# Patient Record
Sex: FEMALE | Race: NATIVE HAWAIIAN OR OTHER PACIFIC ISLANDER | NOT HISPANIC OR LATINO | URBAN - METROPOLITAN AREA
[De-identification: names, ages, dates, MRNs, and addresses within clinical notes are randomized per-mention and may not be internally consistent; named-entity substitution may affect disease eponyms.]

---

## 2019-03-18 ENCOUNTER — TELEPHONE (OUTPATIENT)
Dept: PSYCHIATRY | Facility: CLINIC | Age: 34
End: 2019-03-18
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
## 2019-03-27 ENCOUNTER — OFFICE VISIT (OUTPATIENT)
Dept: BEHAVIORAL/MENTAL HEALTH CLINIC | Facility: CLINIC | Age: 34
End: 2019-03-27
Payer: COMMERCIAL

## 2019-03-27 DIAGNOSIS — F41.1 GENERALIZED ANXIETY DISORDER: Primary | ICD-10-CM

## 2019-03-27 PROCEDURE — 90791 PSYCH DIAGNOSTIC EVALUATION: CPT | Performed by: SOCIAL WORKER
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
## 2019-03-29 ENCOUNTER — DOCUMENTATION (OUTPATIENT)
Dept: BEHAVIORAL/MENTAL HEALTH CLINIC | Facility: CLINIC | Age: 34
End: 2019-03-29

## 2019-03-29 NOTE — PROGRESS NOTES
Treatment Plan Tracking    # 1Treatment Plan not signed due to: Clau Cunha was unable to sign her tx plan due to having to leave early to get her daughter from the bus  She agreed to sign at next session

## 2019-04-12 ENCOUNTER — OFFICE VISIT (OUTPATIENT)
Dept: BEHAVIORAL/MENTAL HEALTH CLINIC | Facility: CLINIC | Age: 34
End: 2019-04-12
Payer: COMMERCIAL

## 2019-04-12 DIAGNOSIS — F41.1 GENERALIZED ANXIETY DISORDER: Primary | ICD-10-CM

## 2019-04-12 PROCEDURE — 90834 PSYTX W PT 45 MINUTES: CPT | Performed by: SOCIAL WORKER

## 2019-04-18 ENCOUNTER — OFFICE VISIT (OUTPATIENT)
Dept: BEHAVIORAL/MENTAL HEALTH CLINIC | Facility: CLINIC | Age: 34
End: 2019-04-18
Payer: COMMERCIAL

## 2019-04-18 DIAGNOSIS — F41.1 GENERALIZED ANXIETY DISORDER: Primary | ICD-10-CM

## 2019-04-18 PROCEDURE — 90834 PSYTX W PT 45 MINUTES: CPT | Performed by: SOCIAL WORKER

## 2019-04-22 ENCOUNTER — OFFICE VISIT (OUTPATIENT)
Dept: BEHAVIORAL/MENTAL HEALTH CLINIC | Facility: CLINIC | Age: 34
End: 2019-04-22
Payer: COMMERCIAL

## 2019-04-22 DIAGNOSIS — F41.1 GENERALIZED ANXIETY DISORDER: Primary | ICD-10-CM

## 2019-04-22 PROCEDURE — 90834 PSYTX W PT 45 MINUTES: CPT | Performed by: SOCIAL WORKER

## 2019-04-29 ENCOUNTER — OFFICE VISIT (OUTPATIENT)
Dept: BEHAVIORAL/MENTAL HEALTH CLINIC | Facility: CLINIC | Age: 34
End: 2019-04-29
Payer: COMMERCIAL

## 2019-04-29 DIAGNOSIS — F41.1 GENERALIZED ANXIETY DISORDER: Primary | ICD-10-CM

## 2019-04-29 PROCEDURE — 90834 PSYTX W PT 45 MINUTES: CPT | Performed by: SOCIAL WORKER

## 2019-05-06 ENCOUNTER — OFFICE VISIT (OUTPATIENT)
Dept: BEHAVIORAL/MENTAL HEALTH CLINIC | Facility: CLINIC | Age: 34
End: 2019-05-06
Payer: COMMERCIAL

## 2019-05-06 DIAGNOSIS — F41.1 GENERALIZED ANXIETY DISORDER: Primary | ICD-10-CM

## 2019-05-06 PROCEDURE — 90834 PSYTX W PT 45 MINUTES: CPT | Performed by: SOCIAL WORKER

## 2019-05-21 ENCOUNTER — OFFICE VISIT (OUTPATIENT)
Dept: BEHAVIORAL/MENTAL HEALTH CLINIC | Facility: CLINIC | Age: 34
End: 2019-05-21
Payer: COMMERCIAL

## 2019-05-21 DIAGNOSIS — F41.1 GENERALIZED ANXIETY DISORDER: Primary | ICD-10-CM

## 2019-05-21 PROCEDURE — 90834 PSYTX W PT 45 MINUTES: CPT | Performed by: SOCIAL WORKER

## 2019-07-02 ENCOUNTER — SOCIAL WORK (OUTPATIENT)
Dept: BEHAVIORAL/MENTAL HEALTH CLINIC | Facility: CLINIC | Age: 34
End: 2019-07-02
Payer: COMMERCIAL

## 2019-07-02 DIAGNOSIS — F41.1 GENERALIZED ANXIETY DISORDER: Primary | ICD-10-CM

## 2019-07-02 PROCEDURE — 90834 PSYTX W PT 45 MINUTES: CPT | Performed by: SOCIAL WORKER
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
## 2019-07-08 ENCOUNTER — SOCIAL WORK (OUTPATIENT)
Dept: BEHAVIORAL/MENTAL HEALTH CLINIC | Facility: CLINIC | Age: 34
End: 2019-07-08
Payer: COMMERCIAL

## 2019-07-08 DIAGNOSIS — F41.1 GENERALIZED ANXIETY DISORDER: Primary | ICD-10-CM

## 2019-07-08 PROCEDURE — 90834 PSYTX W PT 45 MINUTES: CPT | Performed by: SOCIAL WORKER

## 2019-07-09 NOTE — PSYCH
Psychotherapy Provided: Individual Psychotherapy 50 minutes     Length of time in session: 50 minutes, follow up in 2-1/2 weeks    Goals addressed in session: Goal 1     Pain:      none    0    Current suicide risk : Low     DATA: Met with Allegra Zhang for scheduled individual session  Allegra Zhang talked about her recent trip to [place]  She states that her family had a very nice time  We spoke, at length, about her relationship with her ex- and the status of their divorce  She states she continues to wait for him to file for divorce, as she does not have the financial ability to do so  She talked about her previous attempts to maintain his connection with their children; e g , she would often reach out to her to ask him if he was going to attend events or was planning to have a visit with them  She states she is working on allowing him to initiate the contact with the children  She states that it creates more stress for her to be caring for them and also thinking about scheduling for him  She states she will do whatever she can to help maintain the connection; however, she is no longer going to make his plans for him  Allegra Zhang continues to state that her new relationship is going well  Her boyfriend is being emotionally supportive to her  She also reports that her children are getting along well with him  ASSESSMENT: Allegra Zhang presents with a primarily euthymic mood  Her affect is full range and congruent with her mood and the content of the session  Allegra Zhang exhibits continued positive rapport with this clinician  Allegra Zhang appears to have improving insight regarding her tendency to be codependent (e g , reminding her ex- of visits with the children, etc )  Allegra Zhang exhibits good judgement with regard to her decision to do what she can to maintain the children's positive relationship with their father  Allegra Zhang continues to exhibit willingness to work on treatment goals and objectives         PLAN: Allegra Zhang will return in 2 5 weeks for the next session  Between sessions, Ramnoa Mcintosh will continue to work on practicing mindfulness to the moment and basic stress management techniques  She will report back during the next session re: successes and barriers  At the next session, this clinician will use mindfulness-based strategies and client-centered therapy to address her anxiety, in an effort to assist Ramona Mcintosh with meeting treatment goals  Behavioral Health Treatment Plan [facility]: Diagnosis and Treatment Plan explained to João Stewart relates understanding diagnosis and is agreeable to Treatment Plan   Yes

## 2019-07-26 ENCOUNTER — SOCIAL WORK (OUTPATIENT)
Dept: BEHAVIORAL/MENTAL HEALTH CLINIC | Facility: CLINIC | Age: 34
End: 2019-07-26
Payer: COMMERCIAL

## 2019-07-26 DIAGNOSIS — F41.1 GENERALIZED ANXIETY DISORDER: Primary | ICD-10-CM

## 2019-07-26 PROCEDURE — 90834 PSYTX W PT 45 MINUTES: CPT | Performed by: SOCIAL WORKER

## 2019-07-29 NOTE — PSYCH
Psychotherapy Provided: Individual Psychotherapy 45 minutes     Length of time in session: 45 minutes, follow up in 2 months Juliette Naik will remain on the cancellation wait list for a sooner appointment)  Goals addressed in session: Goal 1     Pain:      none    0    Current suicide risk : Low     DATA: Met with Chichi for scheduled individual session  Chichi discussed her frustration with her  and her 's mother  She states that they are arguing with her regarding her relationship with her new boyfriend  She states that her 's mother was watching her son, stopped paying attention to him, and then blamed her boyfriend for not making sure that her son was safe  Chichi states that her son was running in grass and was in no danger at all  She received a text message from her  relaying information that her boyfriend will no longer be able to watch the children if he is not cognizant of their whereabouts  Chichi states that nothing bad happened and that she set some appropriate limits with her  regarding his texts t her and his mother's visits with the children  Chichi talked about her relationship with her SO  She states that she is feeling very happy with him  She states that, despite the brevity of this relationship, he is making plans for their future  She states that he has purchased tickets to go to [place] in December  She states that he has also been helping her financially  She states that she is trying to take things slowly with him (she refers to it as "our slow")  She states he has suggested marriage and engagement; however, she wants to wait until she and her  are   Chichi discussed his relationship with his parents  She states that his parents have met her a few times but have told her SO that they do not want to develop a relationship with her yet, because they feel that the relationship is too new   She states that earlier today, she was at [facility] with her client  She saw his parents three times during her shopping, and they completely ignored her  She states she made eye contact with them and smiled  She states they looked the other way and did not greet her at all  She states that her SO was going to call them to discuss this; however, she does not want him to terminate his relationship with them over this  She does, however, state that family is very important to her, and she does not want to join a family that does not accept her  She discussed the situation further and states that her SO's brothers have no contact with their parents, due to "something between them and his brother's wives " We discussed the possibility of this being a pattern for them  Jose Chaudhry is agreeable to having an open discussion with her SO about this topic  Jose Chaudhry wants to be seen prior to her next scheduled session (which is in September)  She will be added to the cancellation wait list for a sooner appointment  ASSESSMENT: Jose Chaudhry presents with a primarily euthymic mood, with some appropriate anxiety/sadness related to her SO's parents not acknowledging her today  Her affect is full range and congruent with her mood and the content of the session  Jose Chaudhry exhibits a positive therapeutic rapport with this clinician  Jose Chaudhry appears to have improving insight regarding her need to set boundaries and limits with her   Jose Chaudhry exhibits good judgement with regard to her decision to discuss her feelings with her SO, regarding his parents  Jose Chaudhry continues to exhibit willingness to work on treatment goals and objectives  PLAN: Jose Chaudhry will return in 2 months for the next scheduled session; however, she will be on the cancellation wait list for a sooner appointment  Between sessions, Jose Chaudhry will continue to set appropriate limits and boundaries in her various relationships and will report back during the next session re: successes and barriers   At the next session, this clinician will use DBT-informed skills, mindfulness strategies, and client-centered therapy to address Monse's anxiety and relationship concerns, in an effort to assist John Gary with meeting treatment goals  Behavioral Health Treatment Plan [facility]: Diagnosis and Treatment Plan explained to Tanya Griggs relates understanding diagnosis and is agreeable to Treatment Plan   Yes

## 2019-08-08 ENCOUNTER — SOCIAL WORK (OUTPATIENT)
Dept: BEHAVIORAL/MENTAL HEALTH CLINIC | Facility: CLINIC | Age: 34
End: 2019-08-08
Payer: COMMERCIAL

## 2019-08-08 DIAGNOSIS — F41.1 GENERALIZED ANXIETY DISORDER: Primary | ICD-10-CM

## 2019-08-08 PROCEDURE — 90834 PSYTX W PT 45 MINUTES: CPT | Performed by: SOCIAL WORKER

## 2019-08-08 NOTE — BH TREATMENT PLAN
Andrew Gonzalez  1985       Date of Initial Treatment Plan: March 27, 2019   Date of Current Treatment Plan: 08/08/19    Treatment Plan Number 2     Strengths/Personal Resources for Self Care: Supportive relationships (mother, SO); Desire to be a good mother; Motivation to attend regular therapy; Good clinical rapport     Diagnosis:   1  Generalized anxiety disorder         Area of Needs: Anxiety      Long Term Goal 1: Chichi would like to be able to manage anxiety in various facets of her life (e g , anxiety related to her pending divorce, her relationship with her ex-, visitation between her ex- and their children, moving forward in her new life, and social anxiety situations)  Target Date: November 15, 2019  Completion Date: to be determined         Short Term Objectives for Goal 1:  1  Chichi will continue to identify triggers and prompting events that increase her symptoms of anxiety  She will review newly discovered triggers and prompting events in therapy sessions  2  Chichi will identify, review, and maintain her personal limits in various relationships (e g , her relationship with her ex-, her ex-'s mother, and her relationship with her significant other)  3  Chichi will learn how to reframe her thoughts, in order to increase her self-respect and self-confidence  She will discuss successes and barriers in therapy sessions  4  Chichi will learn and use mindfulness-based skills to help her cope with and manage her anxiety  GOAL 1: Modality: Individual 1-2x per month   Completion Date to be determined and The person(s) responsible for carrying out the plan is  Chichi (client) and Leonid Jones (clinician)    Clinician will use mindfulness-based strategies, DBT-informed skills, CBT techniques, client-centered therapy, solution-focused therapy, Motivational Interviewing to address Eves symptoms of anxiety   Chichi will practice skills between sessions and will report back, during subsequent sessions regarding successes and barriers  Behavioral Health Treatment Plan [facility]: Diagnosis and Treatment Plan explained to Supriya Atkins relates understanding diagnosis and is agreeable to Treatment Plan  Client Comments : Please share your thoughts, feelings, need and/or experiences regarding your treatment plan: "I look forward to my appointments  I get sad when they are so far apart  I rely on this one hour  I feel like I've made progress in some areas   I don't feel like there needs to be anything done differently "

## 2019-08-12 NOTE — PSYCH
Psychotherapy Provided: Individual Psychotherapy 45 minutes     Length of time in session: 45 minutes, follow up in 6 weeks for next scheduled appointment  Cy Beasley will remain on cancellation wait list for a sooner appointment)    Goals addressed in session: Goal 1     Pain:      none    0    Current suicide risk : Low     DATA: Met with Baldemar Cruz for scheduled individual session  During this session, Baldemar Cruz and LUCITA discussed and updated her treatment plan  She identifies that she is developing a positive therapeutic relationship and feels that the therapeutic interactions have been helpful with regard to processing her anxiety and learning new skills to help her manage her moods  Baldemar Cruz states that she would like to have the ability to set and maintain her personal limits--especially with regard to her ex- and his mother  Baldemar Cruz discussed some recent interactions she has had with her  and his mother  She states that her current SO is encouraging her to work on maintaining better limits, as he does not like to see Baldemar Cruz get upset  Baldemar Cruz identifies that she does not like conflict; therefore, she acknowledges that she might take on too much responsibility for making everyone happy  She states she is unsure why her ex- has not yet officially filed for divorce; but she does believe it is because he is living in [place]  She has a perception that [place] has stricter rules regarding divorce and child custody  She states she has a fear that he could potentially petition for custody of the children  When we reality tested this fear, she was able to acknowledge that she still has a video recording of him engaging in inappropriate behaviors while the children were in the same room  She is able to identify that a  who views this video recording would not allow her ex- to have unsupervised visits until he has engaged in some sort of therapy  Baldemar Cruz continues to move forward with her current relationship   We discussed her feelings about her boyfriend's parents  She states that they did not intentionally ignore her (as she had thought during our previous session); however, she continues to report that her feelings are hurt by them not wanting to get to know her until the relationship has continued for a while  She continues to work on setting limits and boundaries with her boyfriend regarding the future of their relationship  ASSESSMENT: Paul Wilkerson presents with a primarily euthymic mood, with some anxiety  Her affect is full range and congruent with her mood and the content of the session  Paul Wilkerson exhibits a positive therapeutic rapport with this clinician  Paul Wilkerson appears to have some insight regarding her difficulty with setting and maintaining appropriate limits in various relationships (e g , with her ex-, his mother, and her current SO)  Paul Wilkerson continues to exhibit willingness to work on treatment goals and objectives  PLAN: Paul Wilkerson will return in 6 weeks for the next scheduled session; however, she will remain on the cancellation wait list for a sooner appointment  Between sessions, Paul Wilkerson will identify some limits/boundaries that she would like to maintain  We will discuss these in our next session and will work out a plan to help her maintain her personal limits and practice getting more comfortable with her decisions  She will report back during the next session re: successes and barriers  At the next session, this clinician will use client-centered therapy and mindfulness-based strategies to address her mood regulation and anxiety, in an effort to assist Paul Wilkerson with meeting treatment goals  Behavioral Health Treatment Plan [facility]: Diagnosis and Treatment Plan explained to Fawn Rodriguez relates understanding diagnosis and is agreeable to Treatment Plan   Yes

## 2019-08-21 ENCOUNTER — SOCIAL WORK (OUTPATIENT)
Dept: BEHAVIORAL/MENTAL HEALTH CLINIC | Facility: CLINIC | Age: 34
End: 2019-08-21
Payer: COMMERCIAL

## 2019-08-21 DIAGNOSIS — F41.1 GENERALIZED ANXIETY DISORDER: Primary | ICD-10-CM

## 2019-08-21 PROCEDURE — 90834 PSYTX W PT 45 MINUTES: CPT | Performed by: SOCIAL WORKER

## 2019-08-21 NOTE — PSYCH
Psychotherapy Provided: Individual Psychotherapy 45 minutes     Length of time in session: 45 minutes, follow up in 1 month Gabino Reardon will remain on the cancellation wait list for a sooner appointment)    Goals addressed in session: Goal 1     Pain:      none    0    Current suicide risk : Low     DATA: Met with Jose Azar for scheduled individual session  Jose Azar reports that she has been doing ok  She states that she continues to struggle with the balance between her ex- and her current SO  She states she is working on developing her own personal limits regarding supervising visits between her ex- and their children  She states she was feeling very good about saying "no" to a last-minute visit on her birthday weekend  She states she will never deny him access to visitation; however, she is trying to work on maintaining a limit of providing the visits only with appropriate advance notice  She states that her SO would prefer she not supervise the visits at all; however, she continues to feel that her supervision is important to help the children maintain a paternal contact  She states that her ex- offered to pay for their daughter's swimming tuition  Jose Azar was happy about this; however, she also was able to identify that she struggles to trust that he is doing it without any strings attached  Jose Azar discussed her ongoing relationship with her new boyfriend  She states he continues to pressure her to make plans for their future lives together  She states that she enjoys being with him; however, she does have some anxiety related to how much he wants her to commit to marrying him and having a child together in the future  She does report that his parents have expressed an interest in meeting her and in getting to know her children  She states this is a positive; however, she is cautious regarding introducing people to her children, as she fears that her children will be hurt         ASSESSMENT: Jose Azar presents with a primarily euthymic mood  Her affect is full range and congruent with her mood and the content of the session  Abilio Kruger exhibits a positive therapeutic rapport with this clinician  Abilio Kruger appears to have some improving insight regarding her ability to set and maintain some healthy personal limits  Abilio Kruger continues to exhibit willingness to work on treatment goals and objectives  PLAN: Abilio Kruger will return in one month for the next scheduled session; however, she will remain on the cancellation wait list for a sooner appointment  Between sessions, Abilio Kruger will continue to identify personal limits and will report back during the next session re: successes and barriers  At the next session, this clinician will use client-centered therapy, DBT-informed skills, and CBT techniques to address her anxiety, in an effort to assist Abilio Kruger with meeting treatment goals  Behavioral Health Treatment Plan [facility]: Diagnosis and Treatment Plan explained to Kandice Landers relates understanding diagnosis and is agreeable to Treatment Plan   Yes

## 2019-09-25 ENCOUNTER — SOCIAL WORK (OUTPATIENT)
Dept: BEHAVIORAL/MENTAL HEALTH CLINIC | Facility: CLINIC | Age: 34
End: 2019-09-25
Payer: COMMERCIAL

## 2019-09-25 DIAGNOSIS — F41.1 GENERALIZED ANXIETY DISORDER: Primary | ICD-10-CM

## 2019-09-25 PROCEDURE — 90834 PSYTX W PT 45 MINUTES: CPT | Performed by: SOCIAL WORKER

## 2019-09-25 NOTE — PSYCH
Psychotherapy Provided: Individual Psychotherapy 45 minutes     Length of time in session: 45 minutes, follow up in  1 week    Goals addressed in session: Goal 1     Pain:    No physical pain reported, mild to moderate anxiety     Current suicide risk : Low      DATA: Met with Cris Ritchie for scheduled individual session  Cris Ritchie discussed current stress in relation to her boyfriend's mother  His mother flips between being friendly and accepting of Cris Putt to saying very hurtful things  Cris Ritchie read this clinician a text in which she told Cris Ritchie she wanted nothing to do with her or her children and she was not welcome in the family  Cris Ritchie reports she cried when she found the text and since then has had a lot of anxiety, especially if she might run into her boyfriend's mother  Despite the hurtful things that were said, Cris Ritchie stated has a need for acceptance and family and believes she may accept an apology from her if one is offered  Cris Ritchie discussed how her boyfriend's brothers have nothing to do with their mother due to her behavior  Monse's boyfriend has been defensive of Cris Putt to his mother and his mother's behaviors have made him angry  Cris Ritchie has been processing her feelings regarding a future and a family with her boyfriend  Cris Ritchie states she does not know how to manage the relationship with his mother  We discussed potentially working on developing a way for her to maintain a relationship with some solid personal boundaries and limits in place to help her protect herself emotionally  Cris Ritchie continues to struggle with her relationship with her ex- and his contact with the children  She states that he communicated with her, via text, stating that she is keeping him from being able to see the children  She states that since he has moved to [place], he has made no effort to change the legal status of their marriage or of the custody orders       ASSESSMENT: Cris Ritchie presents with a primarily euthymic mood with some anxiety when discussing stressors  Full range affect is congruent to topic area  Cris Ritchie exhibits continued positive rapport with this clinician  Cris Ritchie appears to have normal insight and judgment  Cris Ritchie continues to exhibit willingness to work on treatment goals and objectives  PLAN: Cris Ritchie will return in 1 week for the next scheduled session  Between sessions, Cris Ritchie will continue to work on her emotional boundaries in relationships and will report back during the next session re: successes and barriers  At the next session, this clinician will use client-centered therapy, mindfulness-based strategies, DBT-informed skills and CBT techniques to address Monse's anxiety, in an effort to assist Cris Ritchie with meeting treatment goals  Behavioral Health Treatment Plan [facility]: Diagnosis and Treatment Plan explained to Wendycrispin Samsons relates understanding diagnosis and is agreeable to Treatment Plan   Yes

## 2019-10-01 ENCOUNTER — TELEPHONE (OUTPATIENT)
Dept: BEHAVIORAL/MENTAL HEALTH CLINIC | Facility: CLINIC | Age: 34
End: 2019-10-01

## 2019-10-01 NOTE — TELEPHONE ENCOUNTER
T/C to confirm appointment scheduled for October 2, 2019 @ 1:00pm  Carlos Manuel Sebastian did not auto-confirm this appointment; therefore, I made a direct confirmation call  Spoke with client  Confirmed they will be in attendance at the scheduled time

## 2019-10-02 ENCOUNTER — SOCIAL WORK (OUTPATIENT)
Dept: BEHAVIORAL/MENTAL HEALTH CLINIC | Facility: CLINIC | Age: 34
End: 2019-10-02
Payer: COMMERCIAL

## 2019-10-02 DIAGNOSIS — F41.1 GENERALIZED ANXIETY DISORDER: Primary | ICD-10-CM

## 2019-10-02 PROCEDURE — 90834 PSYTX W PT 45 MINUTES: CPT | Performed by: SOCIAL WORKER

## 2019-10-02 NOTE — PSYCH
Psychotherapy Provided: Individual Psychotherapy 45 minutes     Length of time in session: 45 minutes, follow up in 2 weeks    Goals addressed in session: Goal 1     Pain:      mild to moderate anxiety related to current life stressors    Current suicide risk : Low     DATA: Met with Nata Medrano for scheduled individual session  Nata Medrano got engaged to her boyfriend last week but feels that she cannot be happy because everything around her is "crazy " She reported her mother is supportive but her fiance's mother texted her that she is not accepting the relationship and that her boyfriend committed fraud in order to purchase the ring by taking out a credit card in his father's name  Nata Medrano feels bad that this led her to doubt her boyfriend but she asked him about the situation and he denies any validity to it  Nata Medrano is hurt and fearful of her harriet's mother and has stated she does want to have any current or future relationship with her to her boyfriend  He has been supportive and she believes he is coming to terms with the issues with his mother  Nata Medrano reported that she has not been sleeping as she has been ruminating over the situation for the past three days  She has a prescription for Ambien and took one the night before because she knew she needed to sleep  She has not been able to do any of the other things that relieve her anxiety such as going to the gym but plans on taking part in a 10 week fitness program with her fiance where she will be working out five days a week  This clinician discussed mindfulness techniques which Nata Medrano can use to stop the intrusive thoughts and feelings of anxiety she is experiencing  Nata Medrano also agreed to block texts and social media contact with her fiivania's mother to avoid future interactions that are hurtful and increase her anxiety  ASSESSMENT: Nata Medrano presents with a mostly euthymic mood  Full range of affect is congruent to topic area   Nata Medrano exhibits excellent rapprt with this clinician  Stacey Sheridan appears to have normal insight and judgment  Stacey Sheridan continues to exhibit willingness to work on treatment goals and objectives  PLAN: Stacey Sheridan will return in 2 weels for the next scheduled session  Between sessions, Stacey Sheridan will will practice mindfulness techniques and self care as well as continue to set boundaries that protect her emotionally interpersonal relationships and will report back during the next session re: successes and barriers  At the next session, this clinician will use client-centered therapy, mindfulness-based strategies and DBT-informed skills to address Monse's anxiety related to relationships and current life stressors, in an effort to assist Stacey Sheridan with meeting treatment goals  Behavioral Health Treatment Plan [facility]: Diagnosis and Treatment Plan explained to Tung Bolton relates understanding diagnosis and is agreeable to Treatment Plan   Yes

## 2019-10-15 ENCOUNTER — TELEPHONE (OUTPATIENT)
Dept: BEHAVIORAL/MENTAL HEALTH CLINIC | Facility: CLINIC | Age: 34
End: 2019-10-15

## 2019-10-15 NOTE — TELEPHONE ENCOUNTER
T/C to confirm appointment scheduled for October 16, 2019 @ 2:00pm  Gregorio Farmer did not auto-confirm this appointment; therefore, I made a direct confirmation call  Spoke with Gregorio Marleny  Confirmed they will be in attendance at the scheduled time

## 2019-10-16 ENCOUNTER — SOCIAL WORK (OUTPATIENT)
Dept: BEHAVIORAL/MENTAL HEALTH CLINIC | Facility: CLINIC | Age: 34
End: 2019-10-16
Payer: COMMERCIAL

## 2019-10-16 DIAGNOSIS — F41.1 GENERALIZED ANXIETY DISORDER: Primary | ICD-10-CM

## 2019-10-16 PROCEDURE — 90834 PSYTX W PT 45 MINUTES: CPT | Performed by: SOCIAL WORKER

## 2019-10-16 NOTE — PSYCH
Psychotherapy Provided: Individual Psychotherapy 45 minutes     Length of time in session: 45 minutes, follow up in 1 week    Goals addressed in session: Goal 1     Pain:      No current pain reported, mild anxiety reported at this time    Current suicide risk : Low     DATA: Met with Nina Inakoko for scheduled individual session  Nina Jenkins was able to put in writing some of the concerns she has from her children due to her ex 's behavior  She stated she felt good writing it and reading it during this session and is hoping to build the courage to send it if there is any future conflict regarding him making her feel guilty he is not seeing the children more  Monse's fiivania has decided that he is not going to communicate with his parents who have moved out of state  Nina Iankoko has spoken with one of her fiance's sisters-in-law regarding her experiences with his mother  She states that the sister-in-law validated that the behaviors were the same from his mother to her, and that is why the other brothers do not speak to their parents  Nina Inakoko states that the discussion with her helped her to feel that she is "not crazy " She reports her relationship with Roseanne Simms has been going well  They joined a fitness class but on the first day Nina Jenkins had so much anxiety she was not able to go in  She did attend the next day and is now really enjoying it  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  Ninapaula Jenkins aspires to be an   She is more comfortable attending school because her fiance will be with her as she has some anxiety about being in a classroom with a lot of people  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/grants she will be getting for her education  She states this career move has been a dream of hers for a long time, and she feels that the support of her fiance allows her to move forward with starting a new career path  ASSESSMENT: Payton Duggan presents with a mostly euthymic, somewhat anxious mood  Full range affect is congruent to topic area  Payton Duggan exhibits continued positive rapport with this clinician  Payton Duggan appears to have normal insight and judgment  Payton Duggan continues to exhibit willingness to work on treatment goals and objectives  PLAN: Payton Duggan will return in 1 week for the next scheduled session  Between sessions, Payton Duggan will continue to monitor triggers that impact her mood and utilize coping skills to help her in managing her anxiety  and will report back during the next session re: successes and barriers  At the next session, this clinician will use client-centered therapy, mindfulness-based strategies and DBT-informed skills to address Monse's anxiety in an effort to assist Payton Duggan with meeting treatment goals  Behavioral Health Treatment Plan [facility]: Diagnosis and Treatment Plan explained to Joce Pandey relates understanding diagnosis and is agreeable to Treatment Plan  Yes

## 2019-10-23 ENCOUNTER — SOCIAL WORK (OUTPATIENT)
Dept: BEHAVIORAL/MENTAL HEALTH CLINIC | Facility: CLINIC | Age: 34
End: 2019-10-23
Payer: COMMERCIAL

## 2019-10-23 DIAGNOSIS — F41.1 GENERALIZED ANXIETY DISORDER: Primary | ICD-10-CM

## 2019-10-23 PROCEDURE — 90834 PSYTX W PT 45 MINUTES: CPT | Performed by: SOCIAL WORKER

## 2019-10-23 NOTE — PSYCH
Psychotherapy Provided: Individual Psychotherapy 45 minutes     Length of time in session: 45 minutes, follow up in  1 week    Goals addressed in session: Goal 1     Pain:      None    Current suicide risk : Low     DATA: Met with Miguel Angel Thapa for scheduled individual session  Miguel Angel Thapa described stressors related to her [de-identified] year old son's behaviors  She is considering getting him into play therapy  He gets angry and has some impulsive behaviors in   Miguel Angel Thapa could not attend her fitness class the other day because of his behavior which was very upsetting to her  This clinician and Miguel Angel Elier discussed all the changes in his life that could be impacting him  Miguel Angel Thapa also stated her schedule is hectic and she feels tired and overwhelmed at times  After school the children have swimming and she and Levi Lisethmajor take a workout class  She feels tired by the end of her days and the children are still awake and it is a lot to handle  There is both physical exhaustion and some financial stress  She has a plan to manage her expenses as she and Levi Angelo plan to attend school shortly  She feels confident that she will be able to balance her family's needs despite paying for school if she budgets appropriately  Miguel Angel Thapa reports improved relationships with her ex mother and law and that things are going well with Levi Shea's biggest stressor at this time is her son's behavior and some difficulties with her son's  which she discussed in session  Miguel Angel Thapa is thinking to explore play therapy with him  ASSESSMENT: Miguel Angel Thapa presents with a mostly euthymic, somewhat anxious mood  Normal affect is congruent to topic area  Miguel Angel Thapa exhibits good working rapport with this clinician  Miguel Angel Thapa appears to have normal insight and judgment  Miguel Angel Thapa continues to exhibit willingness to work on treatment goals and objectives  PLAN: Miguel Angel Thapa will return in 1 week for the next scheduled session   Between sessions, Miguel Angel Thapa will continue to monitor her anxiety and any triggers to her mood any and will report back during the next session re: successes and barriers  At the next session, this clinician will use client-centered therapy, mindfulness-based strategies and DBT-informed skills to address Monse's anxiety and relationship issues, in an effort to assist Gabino Cabral with meeting treatment goals  Behavioral Health Treatment Plan [facility]: Diagnosis and Treatment Plan explained to Collin Correa relates understanding diagnosis and is agreeable to Treatment Plan   Yes

## 2019-10-24 NOTE — PROGRESS NOTES
Assessment/Plan:  Possible franciscotegeniechpooja, Gordo Chan, ovulatory mucus- but not congruent with menstrual timing  Vs    Possible ovarian cyst causing pain and menstrual dysfunction  Reassured  Return to the office with the next episode  Probable ultrasound  Prenatal vitamins with 5714 micro g of folic acid daily  Diagnoses and all orders for this visit:    Pelvic pain    Vaginal discharge    Abnormal uterine bleeding    Other orders  -     albuterol (PROVENTIL HFA,VENTOLIN HFA) 90 mcg/act inhaler; Inhale 2 puffs every 6 (six) hours as needed  -     beclomethasone (QVAR REDIHALER) 40 MCG/ACT inhaler; Inhale 2 puffs 2 (two) times a day  -     fluticasone (FLONASE) 50 mcg/act nasal spray; 2 sprays into each nostril  -     loratadine (CLARITIN) 10 mg tablet; Take 10 mg by mouth  -     montelukast (SINGULAIR) 10 mg tablet; Take 10 mg by mouth  -     Discontinue: norethindrone-ethinyl estradiol (JUNEL FE 1/20) 1-20 MG-MCG per tablet; Take 1 tablet by mouth daily  -     Discontinue: oxyCODONE-acetaminophen (PERCOCET) 5-325 mg per tablet; One (1) Tab Every 6 Hours PRN moderate to severe pain for 1st day,  One (1) Tab Every 12 Hours PRN moderate to severe pain for 2nd day,  One (1) Tab Once PRN moderate to severe pain for 3rd day  -     Discontinue: venlafaxine (EFFEXOR-XR) 37 5 mg 24 hr capsule; Take 37 5 mg by mouth daily              Subjective:        Patient ID: Krysta Edwards is a 30 y o  female  Mrs Lexy Olson is a 82-year-old  3 para 2 white female with recent abdominal pain, mucoid vaginal discharge and atypical bleeding  She had been on Depo-Provera since the delivery of her last child  The last dose was in 2018  She began menstruating regularly in   Her cycles were every 28-29 days  They are passively trying to conceive  She enjoyed amenorrhea while on the Depo-Provera  In August her cycle was different     It was associated with severe cramps, a snotty discharge, and bleeding several days after where she would have expected to ovulate  In September she had a normal cycle regarding pain but her menses was lighter and shorter than usual    It lasted only 2 days  She was evaluated in September for the abnormal cycle proceeding month but was not given a explanation  A similar episode occurred in October  Her menses began September 30th  She elected to seek care elsewhere  She had met me during 1 of her prior pregnancies  The following portions of the patient's history were reviewed and updated as appropriate: She  has a past medical history of Asthma  Patient Active Problem List    Diagnosis Date Noted    Generalized anxiety disorder 03/27/2019   PMH:  Asthma  Menarche 17- no problems with cycles  Gardasil '05  Abn Pap, Nl Colpo '05  G3, P2; SAVD 4/11 F 6#'s  Pyelonephritis 1st half of pregnancy, cystoscopy with stent placement for suspected nephrolithiasis  Bed rest beginning at 26 weeks for cervical shortening  Induced at 36 weeks;, Complete AB '13; SAVD 9/15 M 36 wks 7 #'s, placenta previa which resolved, transferred to [facility] for Shavon Arcos  She  has no past surgical history on file  Her family history includes Asthma in her half-sister and mother; Cancer in her maternal grandmother; Heart attack in her father; No Known Problems in her daughter, half-brother, half-brother, half-brother, and son  FH:  F- d 48 MI  M- Asthma  MGM- d Lung Ca mets to Brain  She  reports that she has never smoked  She has never used smokeless tobacco  She reports that she does not drink alcohol or use drugs  SH: , unemployed  Was 40 minutes late for the appointment and the social history could not be thoroughly evaluated  She went to the wrong office    Current Outpatient Medications   Medication Sig Dispense Refill    albuterol (PROVENTIL HFA,VENTOLIN HFA) 90 mcg/act inhaler Inhale 2 puffs every 6 (six) hours as needed      beclomethasone (QVAR REDIHALER) 40 MCG/ACT inhaler Inhale 2 puffs 2 (two) times a day      fluticasone (FLONASE) 50 mcg/act nasal spray 2 sprays into each nostril      loratadine (CLARITIN) 10 mg tablet Take 10 mg by mouth      montelukast (SINGULAIR) 10 mg tablet Take 10 mg by mouth       No current facility-administered medications for this visit  Current Outpatient Medications on File Prior to Visit   Medication Sig    albuterol (PROVENTIL HFA,VENTOLIN HFA) 90 mcg/act inhaler Inhale 2 puffs every 6 (six) hours as needed    beclomethasone (QVAR REDIHALER) 40 MCG/ACT inhaler Inhale 2 puffs 2 (two) times a day    fluticasone (FLONASE) 50 mcg/act nasal spray 2 sprays into each nostril    loratadine (CLARITIN) 10 mg tablet Take 10 mg by mouth    montelukast (SINGULAIR) 10 mg tablet Take 10 mg by mouth    [DISCONTINUED] norethindrone-ethinyl estradiol (JUNEL FE 1/20) 1-20 MG-MCG per tablet Take 1 tablet by mouth daily    [DISCONTINUED] venlafaxine (EFFEXOR-XR) 37 5 mg 24 hr capsule Take 37 5 mg by mouth daily    [DISCONTINUED] oxyCODONE-acetaminophen (PERCOCET) 5-325 mg per tablet One (1) Tab Every 6 Hours PRN moderate to severe pain for 1st day,  One (1) Tab Every 12 Hours PRN moderate to severe pain for 2nd day,  One (1) Tab Once PRN moderate to severe pain for 3rd day  No current facility-administered medications on file prior to visit  She is allergic to aspirin; aspirin buf([id number]); naproxen; penicillins; latex; and hydrocodone-acetaminophen       Review of Systems   Constitutional: Negative for activity change, appetite change, fatigue and unexpected weight change  Eyes: Negative for visual disturbance  Respiratory: Negative for cough, chest tightness, shortness of breath and wheezing  Cardiovascular: Negative for chest pain, palpitations and leg swelling  Breast: Patient denies tenderness, nipple discharge, masses, or erythema     Gastrointestinal: Negative for abdominal distention, abdominal pain, blood in stool, constipation, diarrhea, nausea and vomiting  Endocrine: Negative for cold intolerance and heat intolerance  Genitourinary: Negative for decreased urine volume, difficulty urinating, dyspareunia, dysuria, frequency, hematuria, menstrual problem, pelvic pain, urgency, vaginal bleeding, vaginal discharge and vaginal pain  [place] is almost daily without problems  There is no dyspareunia  No birth control is being practice  Musculoskeletal: Negative for arthralgias  Skin: Negative for rash  Neurological: Negative for weakness, light-headedness, numbness and headaches  Hematological: Bruises/bleeds easily (She exercises frequently, 6 days a week  )  Psychiatric/Behavioral: Negative for agitation, behavioral problems and sleep disturbance  The patient is not nervous/anxious  Objective:    Vitals:    10/25/19 0847   BP: 110/80   BP Location: Left arm   Patient Position: Sitting   Cuff Size: Standard   Pulse: 86   Weight: 59 9 kg (132 lb)   Height: 5' (1 524 m)            Physical Exam   Constitutional: She is oriented to person, place, and time  She appears well-developed and well-nourished  HENT:   Head: Normocephalic and atraumatic  Eyes: Pupils are equal, round, and reactive to light  Conjunctivae and EOM are normal    Neck: Normal range of motion  Neck supple  No tracheal deviation present  No thyromegaly present  Cardiovascular: Normal rate, regular rhythm and normal heart sounds  No murmur heard  Pulmonary/Chest: Effort normal and breath sounds normal  No respiratory distress  She has no wheezes  Right breast exhibits no inverted nipple, no mass, no nipple discharge, no skin change and no tenderness  Left breast exhibits no inverted nipple, no mass, no nipple discharge, no skin change and no tenderness  No breast tenderness, discharge or bleeding  Breasts are symmetrical    Abdominal: Soft   Bowel sounds are normal  She exhibits no distension and no mass  There is no tenderness  Abdominal Striae   Genitourinary: Vagina normal and uterus normal  Rectal exam shows no external hemorrhoid  No breast tenderness, discharge or bleeding  There is no rash, tenderness or lesion on the right labia  There is no rash, tenderness or lesion on the left labia  Uterus is not deviated, not enlarged and not tender  Cervix exhibits no motion tenderness and no discharge  Right adnexum displays no mass, no tenderness and no fullness  Left adnexum displays no mass, no tenderness and no fullness  Genitourinary Comments: Urethral meatus within normal limits  Perineum within normal limits  Bladder well supported  Mild cervical a version  Musculoskeletal: Normal range of motion  Neurological: She is alert and oriented to person, place, and time  Skin: Skin is warm and dry  Psychiatric: She has a normal mood and affect  Her behavior is normal  Judgment and thought content normal    Nursing note and vitals reviewed

## 2019-10-25 ENCOUNTER — OFFICE VISIT (OUTPATIENT)
Dept: GYNECOLOGY | Facility: CLINIC | Age: 34
End: 2019-10-25
Payer: MEDICARE

## 2019-10-25 VITALS
BODY MASS INDEX: 25.91 KG/M2 | SYSTOLIC BLOOD PRESSURE: 110 MMHG | HEART RATE: 86 BPM | HEIGHT: 60 IN | WEIGHT: 132 LBS | DIASTOLIC BLOOD PRESSURE: 80 MMHG

## 2019-10-25 DIAGNOSIS — R10.2 PELVIC PAIN: Primary | ICD-10-CM

## 2019-10-25 DIAGNOSIS — N93.9 ABNORMAL UTERINE BLEEDING: ICD-10-CM

## 2019-10-25 DIAGNOSIS — N89.8 VAGINAL DISCHARGE: ICD-10-CM

## 2019-10-25 PROCEDURE — 99203 OFFICE O/P NEW LOW 30 MIN: CPT | Performed by: OBSTETRICS & GYNECOLOGY

## 2019-10-25 RX ORDER — LORATADINE 10 MG/1
10 TABLET ORAL
COMMUNITY
Start: 2019-07-19

## 2019-10-25 RX ORDER — NORETHINDRONE ACETATE AND ETHINYL ESTRADIOL 1MG-20(21)
1 KIT ORAL DAILY
COMMUNITY
Start: 2019-02-27 | End: 2019-10-25

## 2019-10-25 RX ORDER — MONTELUKAST SODIUM 10 MG/1
10 TABLET ORAL
COMMUNITY
Start: 2019-07-19

## 2019-10-25 RX ORDER — OXYCODONE HYDROCHLORIDE AND ACETAMINOPHEN 5; 325 MG/1; MG/1
TABLET ORAL
COMMUNITY
Start: 2019-10-12 | End: 2019-10-25

## 2019-10-25 RX ORDER — ALBUTEROL SULFATE 90 UG/1
2 AEROSOL, METERED RESPIRATORY (INHALATION) EVERY 6 HOURS PRN
COMMUNITY
Start: 2019-02-21 | End: 2020-02-21

## 2019-10-25 RX ORDER — VENLAFAXINE HYDROCHLORIDE 37.5 MG/1
37.5 CAPSULE, EXTENDED RELEASE ORAL DAILY
COMMUNITY
Start: 2019-09-12 | End: 2019-10-25

## 2019-10-25 RX ORDER — FLUTICASONE PROPIONATE 50 MCG
2 SPRAY, SUSPENSION (ML) NASAL
COMMUNITY
Start: 2019-07-19

## 2019-10-29 ENCOUNTER — TELEPHONE (OUTPATIENT)
Dept: GYNECOLOGY | Facility: CLINIC | Age: 34
End: 2019-10-29

## 2019-10-29 NOTE — TELEPHONE ENCOUNTER
Patient and Naa Smith were here to see dr Wilfred Alexandre a few days ago to discuss infertility  BALTAZAR stated dr Wilfred Aleaxndre came up with a theory that didn't seem to be true because Gabino Cabral the patient got her period  They wanted to discuss this with a provider and Dr Wilfred Alexandre is out of the office  Would you be able to give them a quick call when you get a chance       Can call BALTAZAR at [phone] or Gabino Cabral at

## 2019-10-30 ENCOUNTER — SOCIAL WORK (OUTPATIENT)
Dept: BEHAVIORAL/MENTAL HEALTH CLINIC | Facility: CLINIC | Age: 34
End: 2019-10-30
Payer: COMMERCIAL

## 2019-10-30 DIAGNOSIS — F41.1 GENERALIZED ANXIETY DISORDER: Primary | ICD-10-CM

## 2019-10-30 PROCEDURE — 90834 PSYTX W PT 45 MINUTES: CPT | Performed by: SOCIAL WORKER
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
## 2019-11-05 ENCOUNTER — TELEPHONE (OUTPATIENT)
Dept: BEHAVIORAL/MENTAL HEALTH CLINIC | Facility: CLINIC | Age: 34
End: 2019-11-05

## 2019-11-06 ENCOUNTER — SOCIAL WORK (OUTPATIENT)
Dept: BEHAVIORAL/MENTAL HEALTH CLINIC | Facility: CLINIC | Age: 34
End: 2019-11-06
Payer: COMMERCIAL

## 2019-11-06 DIAGNOSIS — F41.1 GENERALIZED ANXIETY DISORDER: Primary | ICD-10-CM

## 2019-11-06 PROCEDURE — 90834 PSYTX W PT 45 MINUTES: CPT | Performed by: SOCIAL WORKER

## 2019-11-06 NOTE — TELEPHONE ENCOUNTER
T/C to confirm appointment scheduled for November 6, 2019 @ 2:00pm  Daisy San did not auto-confirm this appointment; therefore, I made a direct confirmation call  No answer  Left message on voicemail  Provided my direct extension and asked for a call back if unable to keep scheduled appointment
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
## 2019-11-13 ENCOUNTER — SOCIAL WORK (OUTPATIENT)
Dept: BEHAVIORAL/MENTAL HEALTH CLINIC | Facility: CLINIC | Age: 34
End: 2019-11-13
Payer: COMMERCIAL

## 2019-11-13 DIAGNOSIS — F43.10 POST TRAUMATIC STRESS DISORDER (PTSD): ICD-10-CM

## 2019-11-13 DIAGNOSIS — F41.1 GENERALIZED ANXIETY DISORDER: Primary | ICD-10-CM

## 2019-11-13 PROCEDURE — 90834 PSYTX W PT 45 MINUTES: CPT | Performed by: SOCIAL WORKER

## 2019-11-13 NOTE — BH TREATMENT PLAN
Bill Cinthia  1985       Date of Initial Treatment Plan: March 27, 2019   Date of Current Treatment Plan: 11/13/19    Treatment Plan Number 3     Strengths/Personal Resources for Self Care: Motivation to be a good mother  Diagnosis:   1  Generalized anxiety disorder     2  Post traumatic stress disorder (PTSD)         Area of Needs: Anxiety    Long Term Goal 1: I want to be able to maintain my personal limits and have a healthy relationship  I want to build my self-respect, so I can maintain my boundaries and limits  Target Date: March 12, 2020  Completion Date: to be determined         Short Term Objectives for Goal 1:      1  Fabricio Stephenson will identify triggers and prompting events that increase symptoms of anxiety  2  Fabricio Stephenson will learn and exhibit understanding of a minimum of three distress tolerance skills to assist with symptom reduction   3  Fabricio Stephenson will learn and exhibit understanding of effective communication skills (using a DBT-informed perspective)  4  Fabricio Stephenson will identify and maintain personal limits and boundaries in relationships with her significant other  Any boundary crossings will be discussed in therapy sessions  5  Fabricio Stephenson will identify and discuss emotions related to her decisions and boundaries within this relationship  She will process these emotions in sessions  10  Fabricio Stephenson will identify the origins of her guilt and will process how her guilt leads to her remaining in unhealthy situations   7  When appropriate, the clinician and Fabricio Stephenson will begin to identify target areas to explore and process past trauma that is effecting current relationships and functioning   8  Clinician will provide psychoeducation regarding options for processing past trauma (including, but not limited to, prolonged exposure, bilateral stimulation)   9  Fabricio Stephenson will maintain a level of anxiety that does not surpass a 5/10 on most days  Incidents that surpass this limit will be process in therapy sessions       GOAL 1: Modality: Individual 1-2 x per month   Completion Date to be determined and The person(s) responsible for carrying out the plan is  Payton Duggan (client) and Norma Power (clinician)    Clinician will use client-centered therapy, mindfulness-based strategies, DBT-informed skills, Motivational Interviewing and solution-focused therapy to address Monse's symptoms of anxiety and PTSD  Payton Duggan will practice skills between sessions and will report back, during subsequent sessions regarding successes and barriers  Behavioral Health Treatment Plan [facility]: Diagnosis and Treatment Plan explained to Joce Pandey relates understanding diagnosis and is agreeable to Treatment Plan  Client Comments : Please share your thoughts, feelings, need and/or experiences regarding your treatment plan: "These sessions are good   It's helpful to talk "
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
## 2019-12-10 ENCOUNTER — SOCIAL WORK (OUTPATIENT)
Dept: BEHAVIORAL/MENTAL HEALTH CLINIC | Facility: CLINIC | Age: 34
End: 2019-12-10
Payer: COMMERCIAL

## 2019-12-10 DIAGNOSIS — F41.1 GENERALIZED ANXIETY DISORDER: Primary | ICD-10-CM

## 2019-12-10 DIAGNOSIS — F43.10 POST TRAUMATIC STRESS DISORDER (PTSD): ICD-10-CM

## 2019-12-10 PROCEDURE — 90834 PSYTX W PT 45 MINUTES: CPT | Performed by: SOCIAL WORKER

## 2019-12-10 NOTE — PSYCH
Psychotherapy Provided: Individual Psychotherapy 45 minutes     Length of time in session: 45 minutes, follow up in March Carol Guzman will remain on cancellation wait list  At this point, she is only available for 6p appointments)    Goals addressed in session: Goal 1     Pain:      none    Current suicide risk : Low     DATA: Met with Corinne Hail for scheduled individual session  Corinnekevin Jauregui expressed gratitude for having a cancellation appointment today, as she is in school and is unable to make it to appointments on a regular basis  She started the session providing an update regarding her family and her current stressors  She states that she has finally gotten approval for [facility] to receive services from [facility]  She states that she needs her ex-'s approval for some of the services, and he is refusing to give the approval  She is retaining  to try to get full legal custody of her children  Her ex- has petitioned to have shared custody, without supervision  Corinne Hail is opposed to this, as she continues to question her ex-'s judgement  She reports she has told her  about the video where her ex- was engaging in masturbation in front of the children  She states her mother is very supportive and is paying for the   Corinne Hail discussed her current participation in school  She shared some photographs of her work  She expressed concern regarding her social anxiety, as her boyfriend will be completing the pre-salon work and will be moving to "the floor" before her  She states that she is struggling to work with other classmates, due to her anxiety  I encouraged her to try to work with other classmates prior to his departure, so she can get used to the interactions  She is also very concerned about her anxiety when she has to move to the floor, as she will be working in a more public environment   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  She is much more focused on becoming an , where 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  Adam Allen states she is working on her relationship with her boyfriend  She states she continues to struggle with trust, but she feels that he is being honest with her and is allowing her to verify her trust by looking at his telephone and reading his messages  She also feels that they are together the majority of the time; therefore, he is not able to engage in covert behaviors  She does express some discomfort with him moving to the floor, where he will be around all women  She states that she does not believe that he will have an affair, but she identifies that she does not have as much trust in his behaviors as she would like  ASSESSMENT: Adam Allen presents with a anxious, dysthymic mood  Her affect is normal and mood-congruent  Adam Allen exhibits a positive therapeutic rapport with this clinician  Adam Allen appears to have normal insight and judgment  Adam Allen continues to exhibit willingness to work on treatment goals and objectives  PLAN: Adam Allen will return in March for the next scheduled session; however, she will remain on the cancellation list for a sooner appointment  Between sessions, Adam Allen will continue to monitor her moods, push herself to engage in some anxiety-producing activities (exposure), and maintain her personal limits/boundaries with her SO  She will report back during the next session re: successes and barriers  At the next session, this clinician will use client-centered therapy, mindfulness-based strategies, DBT-informed skills and solution-focused therapy to address her anxiety and mood regulation, in an effort to assist Adam Allen with meeting treatment goals  Behavioral Health Treatment Plan [facility]: Diagnosis and Treatment Plan explained to Tello Calderon relates understanding diagnosis and is agreeable to Treatment Plan   Yes

## 2019-12-23 ENCOUNTER — SOCIAL WORK (OUTPATIENT)
Dept: BEHAVIORAL/MENTAL HEALTH CLINIC | Facility: CLINIC | Age: 34
End: 2019-12-23
Payer: COMMERCIAL

## 2019-12-23 DIAGNOSIS — F41.1 GENERALIZED ANXIETY DISORDER: Primary | ICD-10-CM

## 2019-12-23 DIAGNOSIS — F43.10 POST TRAUMATIC STRESS DISORDER (PTSD): ICD-10-CM

## 2019-12-23 PROCEDURE — 90834 PSYTX W PT 45 MINUTES: CPT | Performed by: SOCIAL WORKER

## 2019-12-23 NOTE — PSYCH
Psychotherapy Provided: Individual Psychotherapy 45 minutes     Length of time in session: 45 minutes, follow up in     Goals addressed in session: Goal 1     Pain:      moderate to severe-- anxiety currently is 8/10  She reports that she has been as high as a 9 5 out of 10, prior to talking with her mother and her school about her pregnancy  Current suicide risk : Low     DATA: Met with Rosy Coleman for scheduled individual session  "I am going to court on January 2nd for custody  And I'm pregnant " Rosy Coleman states that her mother paid the retainer for the   She states that her  has called for her to come in for an appointment on Friday  She is hoping that this appointment is to work on an out-of-court agreement  Rosy Coleman states that she has also made the decision to file for divorce from her ex-  She states that she is no longer willing to wait for her  to file  She will be talking with the  about filing for divorce, as this has not been previously discussed  Rosy Coleman states that she is going to ask for alimony for at least one year  She will be talking with her  about this, as well  Rosy Coleman states that she and Kika Beltran are doing "pretty good " She states, "He has really stepped up " She has asked him to give her some more space, as she does not want to be in a co-dependent relationship  She wants to be able to have her own independence while in this relationship  Rosy Coleman states she is beginning to feel a bit more comfortable with Kika Beltran  She states that she is developing some increased trust in him  "I can definitely see that he's trying  I can see that, and it makes me feel better " We spent some time talking about her pregnancy  She states she found out last week  She states she is feeling scared and somewhat overwhelmed   She states, "I felt like something was off " She states she is concerned about going through the divorce, custody hearings, finishing school, etc -- all while being pregnant  She states she is scheduled to graduate just short of two weeks prior to the birth of her child  We discussed how her pregnancy might impact her education  Antonia Welch has talked with her mother about her pregnancy  She states that her mother was initially upset about the timing of the pregnancy; however, Antonia Yuri states that her mother is feeling more comfortable with the plan that Antonia Welch and Shelli Gr have developed regarding their future  Antonia Yuri states that she plans to sell her house  She and Shelli Gr have decided that they want to rent a home  Rerelacigianni Welch states that Riccardo's mother sent Antonia Welch a message in the beginning of December  His mother apologized to her for her past behavior  They have been communicating with one another  Antonia Welch states that she and Shelli Gr are being cautiously optimistic about the possibility of rebuilding a relationship with her  Antonia Welch states that her son was moved to the next higher  class  She states his behavior has improved  She believes that he needed to be in a class that offered more stimulation  Antonia Yuri states that her ex- continues to deny signing for him to have behavioral services  She states that she plans to bring this up at the custody hearing, as she believes that he needs to have additional support to help him manage his behaviors  We discussed Monse's current anxiety level (8-9 out of 10)  She is doing some breathing and is going to be returning to the gym  She states that she has no appetite, but she is working on eating a nutritious diet  She states that she is able to sleep and is getting a lot of rest  She endorses significant fatigue  ASSESSMENT: Antonia Welch presents with a anxious mood  Her affect is normal and mood-congruent  Antonia Welch exhibits a positive therapeutic rapport with this clinician  Antonia Welch appears to have normal insight and judgment  Antonia Welch continues to exhibit willingness to work on treatment goals and objectives         PLAN: Antonia Welch will be on the cancellation wait list until the next scheduled session  Between sessions, Sanjuana Wolff will continue to use her existing coping skills and support  She will report back during the next session re: successes and barriers  At the next session, this clinician will use client-centered therapy, mindfulness-based strategies, DBT-informed skills and solution-focused therapy to address her anxiety and mood regulation, in an effort to assist Sanjuana Wolff with meeting treatment goals  Behavioral Health Treatment Plan [facility]: Diagnosis and Treatment Plan explained to Jose Daniel Salas relates understanding diagnosis and is agreeable to Treatment Plan   Yes

## 2020-01-07 ENCOUNTER — TELEPHONE (OUTPATIENT)
Dept: PSYCHIATRY | Facility: CLINIC | Age: 35
End: 2020-01-07

## 2020-01-07 NOTE — TELEPHONE ENCOUNTER
Stacey Sheridan would like a call back @ [phone]  Hasn't been able to get in needs to ask a few questions

## 2020-02-11 ENCOUNTER — SOCIAL WORK (OUTPATIENT)
Dept: BEHAVIORAL/MENTAL HEALTH CLINIC | Facility: CLINIC | Age: 35
End: 2020-02-11
Payer: COMMERCIAL

## 2020-02-11 DIAGNOSIS — F41.1 GENERALIZED ANXIETY DISORDER: Primary | ICD-10-CM

## 2020-02-11 DIAGNOSIS — F43.10 POST TRAUMATIC STRESS DISORDER (PTSD): ICD-10-CM

## 2020-02-11 PROCEDURE — 90834 PSYTX W PT 45 MINUTES: CPT | Performed by: SOCIAL WORKER

## 2020-02-11 NOTE — PSYCH
Psychotherapy Provided: Individual Psychotherapy 45 minutes     Length of time in session: 45 minutes, follow up in one month    Goals addressed in session: Goal 1     Pain:      none    Current suicide risk : Low     DATA: Met with Ilia Donato for scheduled individual session  "I had the custody hearing in January " Ilia Donato states that her ex- is having supervised visitation with the children on Sundays (two Sundays in a row, then one with Ilia Donato, then two Sundays in a row)  His mother is the supervisor of the visits at this point  He has to comply with a mental health assessment regarding his sexually inappropriate behavior  The court has also requested that Ilia Donato get an MMPI and parenting assessment  She states that, at this point, the appointment is estimated to cost $2000 (which is currently cost-prohibitive to her)  She is going to seek alternative providers, to see if she can get this testing done at a cheaper cost  Ilia Donato states that, to date, these visits have been going ok  We spent some time talking about the other things that are going on in Monse's life  Ilia Donato states that school is going well for her  She is receiving chiropractic care two times per week, which she reports is good  Ilia Donato states that her son was kicked out of , and they had to find another  provider  She reports that he had some difficulty with the transition, but he seems to have settled in  She reports that he will be having an evaluation to receive TSS services  She is waiting to see what the recommendations are and hopes to have the TSS in place by April  She reports that her pregnancy is going well  She has a follow up ultrasound later this month  She states she continues to feel nauseated and fatigued  We spent time discussing Monse's relationship with her fiance  She states that 100% of their arguments are about her relationship with her ex-   She states that her goal is to eventually develop a healthy coparenting relationship with her ex-  She states that she has been honest with Herbert Arvizu about her intentions, since the very beginning of their relationship  Ilia Donato states that, right now, she has a pretty strong level of trust in Herbert Arvizu  She discussed her feelings about digital technology making it easier for people to hide things from one another  Ilia Donato discussed her relationship with Riccardo's mother  She states that Riccardo's mother has apologized for her past behaviors  She states that his mother is checking in on her and how she is doing regarding the pregnancy  Ilia Donato states that she is emotionally protecting herself by not talking to her very often  Ilia Donato states that his parents remain in [place] and plan to stay there  Ilia Donato discussed her fears of his mother treating her children differently than the new baby  ASSESSMENT: Ilia Donato presents with an anxious mood  Her affect is normal and mood-congruent  Ilia Donato exhibits a positive therapeutic rapport with this clinician  Ilia Donato appears to have normal insight and judgment  Ilia Donato continues to exhibit willingness to work on treatment goals and objectives  PLAN: Ilia Donato will return in one month for the next scheduled session  Between sessions, Ilia Donato will continue to work on setting and maintaining her personal limits and boundaries with her fiance, her ex-, and others in her life  She will report back during the next session re: successes and barriers  At the next session, this clinician will use client-centered therapy, mindfulness-based strategies, DBT-informed skills and solution-focused therapy to address her mood regulation and anxiety, in an effort to assist Ilia Donato with meeting treatment goals  Behavioral Health Treatment Plan [facility]: Diagnosis and Treatment Plan explained to Naseem Cotter relates understanding diagnosis and is agreeable to Treatment Plan   Yes

## 2020-02-24 ENCOUNTER — SOCIAL WORK (OUTPATIENT)
Dept: BEHAVIORAL/MENTAL HEALTH CLINIC | Facility: CLINIC | Age: 35
End: 2020-02-24
Payer: COMMERCIAL

## 2020-02-24 DIAGNOSIS — F43.10 POST TRAUMATIC STRESS DISORDER (PTSD): ICD-10-CM

## 2020-02-24 DIAGNOSIS — F41.1 GENERALIZED ANXIETY DISORDER: Primary | ICD-10-CM

## 2020-02-24 PROCEDURE — 90834 PSYTX W PT 45 MINUTES: CPT | Performed by: SOCIAL WORKER

## 2020-02-24 NOTE — PSYCH
Psychotherapy Provided: Individual Psychotherapy 45 minutes     Length of time in session: 45 minutes, follow up in 2 weeks    Goals addressed in session: Goal 1     Pain:  moderate to severe-- anxiety    Current suicide risk : Low     DATA: Met with Corinne Hail for scheduled individual session  "My car was repossessed last week " Corinne Hail states that she was initially very upset about the financial situation; however, she is feeling more at peace with the situation  She states that she and her fiance have chosen not to reclaim the car  She states that they are sharing their vehicle; and, at this point in time, she is not in need of a second vehicle  Corinne Hail discussed her relationship with her fiance and with his mother  She reports that everything is going well at this point  Corinne Hail states that she is in her second trimester of her pregnancy and is feeling less nausea  She states that her fiance is very excited about having the baby  Corinne Hail states that she does not know whether or not her ex- is working on completing his court-ordered assessment  She states that she has not yet scheduled her court-ordered testing, but she plans to do so soon, as she is going to use her income tax funds to pay for it  Corinne Hail states that her ex- continues to take the children for visitation, which is supervised by his mother  Corinne Hail states that school is going well  She shared a photograph of a coloring assignment that she had to do for her class  She won a class competition for her work on this project  ASSESSMENT: Corinne Hail presents with a somewhat anxious mood  Her affect is normal and mood-congruent  Corinne Hail exhibits a positive therapeutic rapport with this clinician  Corinne Hail appears to have normal insight and judgment  Corinne Hail continues to exhibit willingness to work on treatment goals and objectives  PLAN: Corinne Hail will return in two weeks for the next scheduled session  Between sessions, Corinne Hail will continue to practice good self-care  She will explore options for completing her court-ordered psychological testing and will report back during the next session re: successes and barriers  At the next session, this clinician will use client-centered therapy, mindfulness-based strategies, DBT-informed skills, family therapy and solution-focused therapy to address her anxiety and mood regulation, in an effort to assist Tommie Palomino with meeting treatment goals  Behavioral Health Treatment Plan [facility]: Diagnosis and Treatment Plan explained to Elli Puga relates understanding diagnosis and is agreeable to Treatment Plan   Yes

## 2020-03-09 ENCOUNTER — TELEPHONE (OUTPATIENT)
Dept: BEHAVIORAL/MENTAL HEALTH CLINIC | Facility: CLINIC | Age: 35
End: 2020-03-09

## 2020-03-09 NOTE — TELEPHONE ENCOUNTER
T/C to confirm appointment scheduled for 3/10/2020 @ 6:00pm  Laurie Arreaga did not auto-confirm this appointment; therefore, I made a direct confirmation call  Spoke with client  Confirmed they will be in attendance at the scheduled time

## 2020-03-10 ENCOUNTER — SOCIAL WORK (OUTPATIENT)
Dept: BEHAVIORAL/MENTAL HEALTH CLINIC | Facility: CLINIC | Age: 35
End: 2020-03-10
Payer: COMMERCIAL

## 2020-03-10 DIAGNOSIS — F43.10 POST TRAUMATIC STRESS DISORDER (PTSD): ICD-10-CM

## 2020-03-10 DIAGNOSIS — F41.1 GENERALIZED ANXIETY DISORDER: Primary | ICD-10-CM

## 2020-03-10 PROCEDURE — 90834 PSYTX W PT 45 MINUTES: CPT | Performed by: SOCIAL WORKER

## 2020-03-10 NOTE — PSYCH
Psychotherapy Provided: Individual Psychotherapy 45 minutes     Length of time in session: 45 minutes, follow up in 2 weeks    Goals addressed in session: Goal 1     Pain:      Moderate anxiety related to family stressors    Current suicide risk : Low     DATA: Met with Cleo Arvizu for scheduled individual session  "My son just got kicked out of  again " Cleo Arvizu states that she found a new  center, but he is not able to start until Thursday  She expressed her frustration with the difficulty with obtaining TSS services for him  She states she will have to miss school, which is stressful for her  Cleo Arvizu expressed anxiety regarding her pregnancy ("I don't want another Gage eYe  ")  Cleo Arvizu states she is feeling extra emotional, as a symptom of her pregnancy hormone fluctuations  Cleo Arvizu also discussed her frustration with finding a  center that will follow through with her feeding plan (which is lactation-focused) for her new child  Cleo Arvizu discussed her participation in school  She reports she is feeling positively about her education  She will be "going on the floor" in about three weeks  She does endorse significant anxiety regarding having to "deal with the public;" however, she does see this educational opportunity as a stepping stone 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   Cleo Arvizu states that she and Natasha Puentes are getting along well  She reports that she continues to have anxiety regarding the custody issues with her ex-  She has not yet gotten her psychological evaluation completed for the courts  We discussed the process of finding a psychologist who can complete the mandated assessment (which includes an MMPI) and who is willing to abide by court mandates  She is concerned about the cost, as the referral that the court provided to her costs $2000 for the assessment   She is hoping that she can find someone who is willing and able to complete the testing and assessment for a more reasonable cost        ASSESSMENT: Cassandra Barber presents with a primarily anxious mood  Her affect is normal and mood-congruent  Cassandra Barber exhibits a positive therapeutic rapport with this clinician  Cassandra Barber appears to have normal insight and judgment  Cassandra Barber continues to exhibit willingness to work on treatment goals and objectives  PLAN: Cassandra Barber will return in two weeks for the next scheduled session  Between sessions, Cassandra Barber will continue to use mindfulness-based strategies to manage her anxiety and will report back during the next session re: successes and barriers  At the next session, this clinician will use client-centered therapy, mindfulness-based strategies, DBT-informed skills and solution-focused therapy to address her anxiety, in an effort to assist Cassandra Barber with meeting treatment goals  Behavioral Health Treatment Plan [facility]: Diagnosis and Treatment Plan explained to Ray Riley relates understanding diagnosis and is agreeable to Treatment Plan   Yes

## 2020-03-17 ENCOUNTER — SOCIAL WORK (OUTPATIENT)
Dept: BEHAVIORAL/MENTAL HEALTH CLINIC | Facility: CLINIC | Age: 35
End: 2020-03-17
Payer: COMMERCIAL

## 2020-03-17 DIAGNOSIS — F41.1 GENERALIZED ANXIETY DISORDER: Primary | ICD-10-CM

## 2020-03-17 DIAGNOSIS — F43.10 POST TRAUMATIC STRESS DISORDER (PTSD): ICD-10-CM

## 2020-03-17 PROCEDURE — 90834 PSYTX W PT 45 MINUTES: CPT | Performed by: SOCIAL WORKER

## 2020-03-17 NOTE — PSYCH
Psychotherapy Provided: Individual Psychotherapy 45 minutes     Length of time in session: 45 minutes, follow up in 1 week    Goals addressed in session: Goal 1     Pain:      moderate to severe    14/10-- "It's nuts "    Current suicide risk : Low     DATA: Met with Nata Medarno for scheduled individual session  "My school is still open " Nata Medrano states that she is experiencing a lot of stress--as a result of her school, her pregnancy, her childcare issues, her ongoing court issues with her ex-, her relationship with her SO, and the issues surrounding the coronavirus  She states that her relationship with her significant other is somewhat stressful  She states, "He is very codependent " She states that she needs to have some time to herself, as she tends to be an independent person  She states that she has told him that he needs to work on that  Nata Medrano states that she feels that this relationship "has the potential to be really good " We discussed her feelings about Riccardo's past behaviors  She continues to have some level of distrust; however, she has been working on building her trust  She states that she continues to have some ambivalence about the relationship, but she verbalizes a motivation to continue to work on building communication  Nata Medrano states that he has found a new therapist, as there was a conflict with the first therapy practice (his wife was also a client at that practice)  She states she believes that he is symptomatic (bipolar symptoms) at this point in time  He is considering going to a PHP program  She states that she is "not willing to jeopardize my mental health, that I have been working on so hard," and she has told him that she is not willing to be in a relationship with someone who is not doing what he needs to do to care for himself  We discussed the stressors related to the coronavirus   Nata Medrano states that she is still in school at this point, and she hopes that she can continue, due to her anticipated graduation date  Her children's  received a waiver to stay open; however, she is using alternative care, to decrease their exposure to the virus  Sanjuana Wolff is working very hard to limit the number of people she has direct or indirect contact with  Sanjuana Wolff has been making some telephone calls to find out where she can get her psychological evaluation for the courts  She has found a potential resource, and she plans to call her back  We discussed making sure that she is getting the appropriate testing for the court  ASSESSMENT: Sanjuana Wolff presents with a somewhat anxious mood (her mood does not appear to be as anxious as her self-report of her anxiety level)  Her affect is normal and mood-congruent  Sanjuana Wolff exhibits a positive therapeutic rapport with this clinician  Sanjuana Wolff appears to have normal insight and judgment  Sanjuana Wolff continues to exhibit willingness to work on treatment goals and objectives  PLAN: Sanjuana Wolff will return in one week for the next scheduled session  Between sessions, Sanjuana Wolff will continue to use her existing coping skills (including distress tolerance and effective communication skills) to address her anxiety and relationship issues and will report back during the next session re: successes and barriers  At the next session, this clinician will use client-centered therapy, mindfulness-based strategies, DBT-informed skills and solution-focused therapy to address her anxiety and relationship stressors, in an effort to assist Sanjuana Wolff with meeting treatment goals  Behavioral Health Treatment Plan [facility]: Diagnosis and Treatment Plan explained to Jose Daniel Salas relates understanding diagnosis and is agreeable to Treatment Plan   Yes

## 2020-03-24 ENCOUNTER — SOCIAL WORK (OUTPATIENT)
Dept: BEHAVIORAL/MENTAL HEALTH CLINIC | Facility: CLINIC | Age: 35
End: 2020-03-24
Payer: COMMERCIAL

## 2020-03-24 DIAGNOSIS — F43.10 POST TRAUMATIC STRESS DISORDER (PTSD): ICD-10-CM

## 2020-03-24 DIAGNOSIS — F41.1 GENERALIZED ANXIETY DISORDER: Primary | ICD-10-CM

## 2020-03-24 PROCEDURE — 90834 PSYTX W PT 45 MINUTES: CPT | Performed by: SOCIAL WORKER

## 2020-03-24 NOTE — PSYCH
Psychotherapy Provided: Individual Psychotherapy 45 minutes     Length of time in session: 45 minutes, follow up in 2 weeks    Goals addressed in session: Goal 1     Pain:      Mild anxiety (she states that "staying at home is making is a lot easier)    4/10 currently    Current suicide risk : Low     DATA: Met with Nina Jenkins for scheduled individual session  "Thursday night, we got a text telling us that campus is closed " Nina Jenkins states that she feels that, despite her history of chronic anxiety, she feels that she is handling this well  She states that she feels she is "getting used to being at home " Her school has given them some alternative ways to get some of their hours, by doing some on-line learning  Nina Jenkins states that Roseanne Simms has still been doing some work, although that will probably be ending in the very near future  Nina Jenkins states that she has been staying at home, most of the time  She states that she is nervous that, once she has to leave her home, she will struggle with having to go back out into socializing  Nina Jenkins shared some of the activities she has been doing with her children to keep them occupied (e g , melting broken crayons to make large crayons, making colored dry rice for them to play with, etc )  Nina Jenkins will be picking up a Chromebook for her daughter to use for school  Nina Jenkins discussed her fear of her ex- not getting a paycheck  She states that if he does not get paid, she will not have any income at all--as her spousal support and child support are her only sources of income  She states, "I'm not concerned about getting the virus  I am most concerned about the unknown "    Nina Jenkins states that she is scheduled to have her psychological evaluation (including her MMPI) completed tomorrow  Nina Jenkins states that she continues to be frustrated with the system, because she has to get this evaluation and testing done, despite the fact that she has "never done anything that has led to me being investigated by CPS  I don't do anything inappropriate to or around my children " I asked Gregorio Farmer to have the psychologist send a copy of the report to me, so we can review the information that he is sending to the court  We briefly discussed Monse's relationship with Gordon Zhang  She states that he recently "blew off a therapy appointment " She states he is doing a bit better with his codependency  She states that he has been adhering to better limits regarding his telephone calls to her  She states that "it was a nice change of pace " She states she talked about him about not wanting to talk with him constantly, "   we don't have anything to talk about when we see each other " She plans to tell him that she notices that he is really trying to do things differently  She identifies that he needs some positive reinforcement from her  Gregorio Farmer states, "This weekend, I had a scare " She reports that she had some cramping and some bleeding  She states that she reached out to her doctor, and got advice  She stayed at home and followed their instructions  She states that things are going well at this point  She is very nervous about having to go to the hospital during the [place] epidemic  She is trying to do whatever she needs to do to stay home  We discussed future video visits, and Gregorio Farmer is agreeable to participating in video sessions, until the [place] crisis is over  ASSESSMENT: Gregorio Farmer presents with a primarily euthymic mood  Her affect is normal and mood-congruent  Gregorio Farmer exhibits a strong therapeutic rapport with this clinician  Gregorio Farmer appears to have normal insight and judgment  Gregorio Farmer continues to exhibit willingness to work on treatment goals and objectives  PLAN: Gregorio Farmer will return in two weeks for the next scheduled session  Between sessions, Gregorio Farmer will continue to use her existing coping skills and will report back during the next session re: successes and barriers   She will complete her psychological examination tomorrow and will ask to have the results sent to me, as well  At the next session, this clinician will use client-centered therapy, mindfulness-based strategies, DBT-informed skills and solution-focused therapy to address her anxiety and mood regulation, in an effort to assist Chichi with meeting treatment goals  Behavioral Health Treatment Plan [facility]: Diagnosis and Treatment Plan explained to Xi Members relates understanding diagnosis and is agreeable to Treatment Plan   Yes

## 2020-04-07 ENCOUNTER — TELEMEDICINE (OUTPATIENT)
Dept: BEHAVIORAL/MENTAL HEALTH CLINIC | Facility: CLINIC | Age: 35
End: 2020-04-07
Payer: COMMERCIAL

## 2020-04-07 DIAGNOSIS — F41.1 GENERALIZED ANXIETY DISORDER: Primary | ICD-10-CM

## 2020-04-07 DIAGNOSIS — F43.10 POST TRAUMATIC STRESS DISORDER (PTSD): ICD-10-CM

## 2020-04-07 PROCEDURE — 90837 PSYTX W PT 60 MINUTES: CPT | Performed by: SOCIAL WORKER

## 2020-04-14 ENCOUNTER — TELEMEDICINE (OUTPATIENT)
Dept: BEHAVIORAL/MENTAL HEALTH CLINIC | Facility: CLINIC | Age: 35
End: 2020-04-14
Payer: COMMERCIAL

## 2020-04-14 DIAGNOSIS — F43.10 POST TRAUMATIC STRESS DISORDER (PTSD): ICD-10-CM

## 2020-04-14 DIAGNOSIS — F41.1 GENERALIZED ANXIETY DISORDER: Primary | ICD-10-CM

## 2020-04-14 PROCEDURE — 90834 PSYTX W PT 45 MINUTES: CPT | Performed by: SOCIAL WORKER

## 2020-05-04 ENCOUNTER — TELEMEDICINE (OUTPATIENT)
Dept: BEHAVIORAL/MENTAL HEALTH CLINIC | Facility: CLINIC | Age: 35
End: 2020-05-04
Payer: COMMERCIAL

## 2020-05-04 DIAGNOSIS — F41.1 GENERALIZED ANXIETY DISORDER: Primary | ICD-10-CM

## 2020-05-04 DIAGNOSIS — F43.10 POST TRAUMATIC STRESS DISORDER (PTSD): ICD-10-CM

## 2020-05-04 PROCEDURE — 90837 PSYTX W PT 60 MINUTES: CPT | Performed by: SOCIAL WORKER

## 2020-05-18 ENCOUNTER — TELEMEDICINE (OUTPATIENT)
Dept: BEHAVIORAL/MENTAL HEALTH CLINIC | Facility: CLINIC | Age: 35
End: 2020-05-18
Payer: COMMERCIAL

## 2020-05-18 DIAGNOSIS — F41.1 GENERALIZED ANXIETY DISORDER: Primary | ICD-10-CM

## 2020-05-18 DIAGNOSIS — F43.10 POST TRAUMATIC STRESS DISORDER (PTSD): ICD-10-CM

## 2020-05-18 PROCEDURE — 90837 PSYTX W PT 60 MINUTES: CPT | Performed by: SOCIAL WORKER

## 2020-06-01 ENCOUNTER — TELEMEDICINE (OUTPATIENT)
Dept: BEHAVIORAL/MENTAL HEALTH CLINIC | Facility: CLINIC | Age: 35
End: 2020-06-01
Payer: COMMERCIAL

## 2020-06-01 DIAGNOSIS — F41.1 GENERALIZED ANXIETY DISORDER: Primary | ICD-10-CM

## 2020-06-01 DIAGNOSIS — F43.10 POST TRAUMATIC STRESS DISORDER (PTSD): ICD-10-CM

## 2020-06-01 PROCEDURE — 90834 PSYTX W PT 45 MINUTES: CPT | Performed by: SOCIAL WORKER

## 2020-06-16 ENCOUNTER — TELEMEDICINE (OUTPATIENT)
Dept: BEHAVIORAL/MENTAL HEALTH CLINIC | Facility: CLINIC | Age: 35
End: 2020-06-16
Payer: COMMERCIAL

## 2020-06-16 DIAGNOSIS — F41.1 GENERALIZED ANXIETY DISORDER: Primary | ICD-10-CM

## 2020-06-16 DIAGNOSIS — F43.10 POST TRAUMATIC STRESS DISORDER (PTSD): ICD-10-CM

## 2020-06-16 PROCEDURE — 90834 PSYTX W PT 45 MINUTES: CPT | Performed by: SOCIAL WORKER

## 2020-06-29 ENCOUNTER — TELEMEDICINE (OUTPATIENT)
Dept: BEHAVIORAL/MENTAL HEALTH CLINIC | Facility: CLINIC | Age: 35
End: 2020-06-29
Payer: COMMERCIAL

## 2020-06-29 DIAGNOSIS — F43.10 POST TRAUMATIC STRESS DISORDER (PTSD): ICD-10-CM

## 2020-06-29 DIAGNOSIS — F41.1 GENERALIZED ANXIETY DISORDER: Primary | ICD-10-CM

## 2020-06-29 PROCEDURE — 90834 PSYTX W PT 45 MINUTES: CPT | Performed by: SOCIAL WORKER

## 2020-07-13 ENCOUNTER — TELEMEDICINE (OUTPATIENT)
Dept: BEHAVIORAL/MENTAL HEALTH CLINIC | Facility: CLINIC | Age: 35
End: 2020-07-13
Payer: COMMERCIAL

## 2020-07-13 DIAGNOSIS — F41.1 GENERALIZED ANXIETY DISORDER: Primary | ICD-10-CM

## 2020-07-13 DIAGNOSIS — F43.10 POST TRAUMATIC STRESS DISORDER (PTSD): ICD-10-CM

## 2020-07-13 PROCEDURE — 90834 PSYTX W PT 45 MINUTES: CPT | Performed by: SOCIAL WORKER

## 2020-07-13 NOTE — PSYCH
Virtual Regular Visit      Assessment/Plan:    Problem List Items Addressed This Visit        Other    Generalized anxiety disorder - Primary    Post traumatic stress disorder (PTSD)               Reason for visit is Behavioral Health session, conducted through video, due to COVID-19 precautions       Encounter provider UTE Dobbins    Provider located at [address]  [facility] [id number]      Recent Visits  No visits were found meeting these conditions  Showing recent visits within past 7 days and meeting all other requirements     Future Appointments  No visits were found meeting these conditions  Showing future appointments within next 150 days and meeting all other requirements        The patient was identified by name and date of birth  Rosa Boothe was informed that this is a telemedicine visit and that the visit is being conducted through [employer]  My office door was closed  No one else was in the room  She acknowledged consent and understanding of privacy and security of the video platform  The patient has agreed to participate and understands they can discontinue the visit at any time  Patient is aware this is a billable service  Subjective  Rosa Boothe is a 30 y o  female  DATA: Met with Leonela Nava for scheduled individual session  "I had my first day back at school today " Leonela Nava requested a letter to take to school with her which would provide her with the ability to leave the classroom if she has any anxiety attacks  She states that she has not had to utilize accommodations; however, she wants to make sure that she is "protected" in case she needs to take a time out from her class  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  This clinician drafted a letter and sent a copy to her via the [employer] portal  A hard copy will also be signed and mailed to her   She states that she is feeling very tired today, due to the increase in her activity level  Pretty Nagy states that her mother will be coming to visit for a while  Monse's mother will be able to attend one of her high risk appointments during her visit  Pretty Nagy states that she was having some mild contractions today and had a non-stress test  She is going to be planning for an induction, due to the numerous complications  She states that the baby has gained a little bit of weight, so they are not quite as worried as they were previously  They are planning to continue to monitor  ASSESSMENT: Pretty Nagy presents with a primarily euthymic mood  Her affect is normal and mood-congruent  Pretty Nagy exhibits a strong therapeutic rapport with this clinician  Pretty Nagy appears to have normal insight and judgment  Pretty Nagy continues to exhibit willingness to work on treatment goals and objectives  PLAN: Pretty Nagy will return in two weeks for the next scheduled session  Between sessions, Pretty Nagy will continue to utilize her existing mindfulness-based coping strategies to manage he rmood and will report back during the next session re: successes and barriers  At the next session, this clinician will use client-centered therapy, mindfulness-based strategies, DBT-informed skills and solution-focused therapy to address her mood regulation and anxiety management, in an effort to assist Pretty Nagy with meeting treatment goals  HPI     Past Medical History:   Diagnosis Date    Asthma        No past surgical history on file  Current Outpatient Medications   Medication Sig Dispense Refill    beclomethasone (QVAR REDIHALER) 40 MCG/ACT inhaler Inhale 2 puffs 2 (two) times a day      fluticasone (FLONASE) 50 mcg/act nasal spray 2 sprays into each nostril      loratadine (CLARITIN) 10 mg tablet Take 10 mg by mouth      montelukast (SINGULAIR) 10 mg tablet Take 10 mg by mouth       No current facility-administered medications for this visit           Allergies   Allergen Reactions    Aspirin Shortness Of Breath    Aspirin Buf(Tyosr-[id number]) Chest Pain, Cough and Shortness Of Breath    Naproxen Shortness Of Breath    Penicillins Hives, Itching, Shortness Of Breath and Wheezing    Latex Itching    Hydrocodone-Acetaminophen Rash       Review of Systems    Video Exam    There were no vitals filed for this visit  Physical Exam     I spent 50 minutes directly with the patient during this visit      VIRTUAL VISIT DISCLAIMER    Luis Miller acknowledges that she has consented to an online visit or consultation  She understands that the online visit is based solely on information provided by her, and that, in the absence of a face-to-face physical evaluation by the physician, the diagnosis she receives is both limited and provisional in terms of accuracy and completeness  This is not intended to replace a full medical face-to-face evaluation by the physician  Luis Miller understands and accepts these terms

## 2020-08-03 ENCOUNTER — TELEMEDICINE (OUTPATIENT)
Dept: BEHAVIORAL/MENTAL HEALTH CLINIC | Facility: CLINIC | Age: 35
End: 2020-08-03
Payer: COMMERCIAL

## 2020-08-03 DIAGNOSIS — F41.1 GENERALIZED ANXIETY DISORDER: Primary | ICD-10-CM

## 2020-08-03 DIAGNOSIS — F43.10 POST TRAUMATIC STRESS DISORDER (PTSD): ICD-10-CM

## 2020-08-03 PROCEDURE — 90834 PSYTX W PT 45 MINUTES: CPT | Performed by: SOCIAL WORKER

## 2020-08-03 NOTE — PSYCH
Virtual Regular Visit      Assessment/Plan:    Problem List Items Addressed This Visit        Other    Generalized anxiety disorder - Primary    Post traumatic stress disorder (PTSD)               Reason for visit is Reason for visit is Behavioral Health session, conducted through video, due to COVID-19 precautions  Tyra Wolff has verbalized a preference to continue with virtual sessions at this time  Tyra Woflf has been offered in-person sessions and has declined  Encounter provider UTE Soriano    Provider located at [address] [place] [address] [address]  [facility] [id number]      Recent Visits  No visits were found meeting these conditions  Showing recent visits within past 7 days and meeting all other requirements     Future Appointments  No visits were found meeting these conditions  Showing future appointments within next 150 days and meeting all other requirements        The patient was identified by name and date of birth  Abelardo Alston was informed that this is a telemedicine visit and that the visit is being conducted through [employer]  My office door was closed  No one else was in the room  She acknowledged consent and understanding of privacy and security of the video platform  The patient has agreed to participate and understands they can discontinue the visit at any time  Patient is aware this is a billable service  Subjective  Abelardo Alston is a 37 y o  female  DATA: Met with Tyra Wolff for scheduled individual session  "I'm scheduled to be induced this weekend " Tyra Wolff discussed her planned induction, which will be taking place this coming weekend  Tyra Wolff states that her mother is still visiting Tyra digedu, and she will be able to stay through the delivery  We discussed Izzy hopes and fears related to the birth of her new child   She states that she is looking forward to her SO being able to care for the baby, as she states that she had to care for her other two children independently (b/c her ex- was not actively involved in parenting)  She states that her SO is very excited, and they have been getting along very well  She states that she is hoping to be able to get a 6-month leave from her school  She states that she has completed all of the classroom lessons and 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, to gain her hands-on hours  Since the COVID crisis has led to the shop portion of the school to close, she is hoping that this plan will work  She has approximately two months of hands-on work to do, and she will then be able to take the licensure exam     Overall, Huang Mcelroy states that her mood has been good  She is looking forward to the delivery, as she continues to have significant itching (due to the pregnancy) and is feeling very uncomfortable  She states that she would like to be able to keep our scheduled session for next Monday  I informed her that, if she is not up to talking, she can call and cancel earlier in the day  We discussed her preference for future sessions  At this point, Huang Mcelroy continues to express an interest in virtual sessions  We will adjust the sessions according to her needs  ASSESSMENT: Huang Mcelroy presents with a primarily euthymic mood  Her affect is normal and mood-congruent  Huang Mcelroy exhibits a positive therapeutic rapport with this clinician  Huang Mcelroy appears to have normal insight and judgment  Huang Mcelroy continues to exhibit willingness to work on treatment goals and objectives  PLAN: Huang Mcelroy will return in one week for the next scheduled session  Between sessions, Huang Mcelroy will continue to maintain open and honest communication with her SO and will report back during the next session re: successes and barriers   At the next session, this clinician will use client-centered therapy, mindfulness-based strategies, DBT-informed skills and solution-focused therapy to address her mood regulation and relationship issues, in an effort to assist Huang Mcelroy with meeting treatment goals  HPI     Past Medical History:   Diagnosis Date    Asthma        No past surgical history on file  Current Outpatient Medications   Medication Sig Dispense Refill    beclomethasone (QVAR REDIHALER) 40 MCG/ACT inhaler Inhale 2 puffs 2 (two) times a day      fluticasone (FLONASE) 50 mcg/act nasal spray 2 sprays into each nostril      loratadine (CLARITIN) 10 mg tablet Take 10 mg by mouth      montelukast (SINGULAIR) 10 mg tablet Take 10 mg by mouth       No current facility-administered medications for this visit  Allergies   Allergen Reactions    Aspirin Shortness Of Breath    Aspirin Buf([id number]) Chest Pain, Cough and Shortness Of Breath    Naproxen Shortness Of Breath    Penicillins Hives, Itching, Shortness Of Breath and Wheezing    Latex Itching    Hydrocodone-Acetaminophen Rash       Review of Systems    Video Exam    There were no vitals filed for this visit  Physical Exam     I spent 45 minutes directly with the patient during this visit      VIRTUAL VISIT DISCLAIMER    Jimi Yanes acknowledges that she has consented to an online visit or consultation  She understands that the online visit is based solely on information provided by her, and that, in the absence of a face-to-face physical evaluation by the physician, the diagnosis she receives is both limited and provisional in terms of accuracy and completeness  This is not intended to replace a full medical face-to-face evaluation by the physician  Jimi Yanes understands and accepts these terms

## 2020-08-10 ENCOUNTER — TELEMEDICINE (OUTPATIENT)
Dept: BEHAVIORAL/MENTAL HEALTH CLINIC | Facility: CLINIC | Age: 35
End: 2020-08-10
Payer: COMMERCIAL

## 2020-08-10 DIAGNOSIS — F43.10 POST TRAUMATIC STRESS DISORDER (PTSD): ICD-10-CM

## 2020-08-10 DIAGNOSIS — F41.1 GENERALIZED ANXIETY DISORDER: Primary | ICD-10-CM

## 2020-08-10 PROCEDURE — 90834 PSYTX W PT 45 MINUTES: CPT | Performed by: SOCIAL WORKER

## 2020-08-10 NOTE — PSYCH
Virtual Regular Visit      Assessment/Plan:    Problem List Items Addressed This Visit        Other    Generalized anxiety disorder - Primary    Post traumatic stress disorder (PTSD)               Reason for visit is Reason for visit is Behavioral Health session, conducted through video, due to COVID-19 precautions  Chuck Hall has verbalized a preference to continue with virtual sessions at this time  Chuck Hall has been offered in-person sessions and has declined  Encounter provider UTE Santacruz    Provider located at [address] [facility]  [address]  [facility] [phone]      Recent Visits  Date Type Provider Dept   08/03/20 [address], [address] [facility]   Showing recent visits within past 7 days and meeting all other requirements     Future Appointments  No visits were found meeting these conditions  Showing future appointments within next 150 days and meeting all other requirements        The patient was identified by name and date of birth  Bela Menon was informed that this is a telemedicine visit and that the visit is being conducted through [employer]  My office door was closed  No one else was in the room  She acknowledged consent and understanding of privacy and security of the video platform  The patient has agreed to participate and understands they can discontinue the visit at any time  Patient is aware this is a billable service  Subjective  Bela Menon is a 37 y o  female  DATA: Met with Chuck Hall for scheduled individual session  'My nurse just came in and told me that I can go home " Chuck Hall was in the process of being discharged from the hospital; however, she requested that we continue with her session  She states that due to the COVID precautions, she is able to go home within 24-hours--as long as the baby is doing ok  She states that the baby was 6lb, 2oz   Chuck Hall states that the itchiness has significantly subsided almost immediately  She is planning to breast feed the baby  We discussed her experience with the birth  Her SO was able to be present with her throughout her hospitalization, and her mother will plan to stay for a few more days  Tanvi Shafer discussed her excitement about returning home to her children  Tanvi Shafer states that her mood is stable at this point  We discussed her past history of postpartum depression  She does not know if her past mood dysregulation was true postpartum depression or if it was situational--as she did not have support from her ex- after either one of the children were born  She states that she feels that Amelia Garza is extremely supportive and plans to take an active role in parenting  Tanvi Shafer states that he was very supportive and present during the birth  She reports that they have been getting along very well  Tanvi Shafer states that she has been able to get a leave from school, as they have put a "COVID leave" in place  She states that she plans to return to school when she is able to complete her floor time  ASSESSMENT: Tanvi Shafer presents with a primarily euthymic mood  Her affect is normal and mood-congruent  Tanvi Shafer exhibits a strong therapeutic rapport with this clinician  Tanvi Shafer appears to have normal insight and judgment  Tanvi Shafer continues to exhibit willingness to work on treatment goals and objectives  PLAN: Tanvi Shafer will return in two weeks for the next scheduled session  Between sessions, Tanvi Shafer will utilize mindfulness-based strategies to manage her moods and anxiety and will report back during the next session re: successes and barriers  Tanvi Shafer acknowledges understanding that she can call and request a sooner appointment if she has any symptoms of postpartum depression   At the next session, this clinician will use client-centered therapy, mindfulness-based strategies, DBT-informed skills and solution-focused therapy to address her mood regulation and anxiety management, in an effort to assist Huang Mcelroy with meeting treatment goals  HPI     Past Medical History:   Diagnosis Date    Asthma        No past surgical history on file  Current Outpatient Medications   Medication Sig Dispense Refill    beclomethasone (QVAR REDIHALER) 40 MCG/ACT inhaler Inhale 2 puffs 2 (two) times a day      fluticasone (FLONASE) 50 mcg/act nasal spray 2 sprays into each nostril      loratadine (CLARITIN) 10 mg tablet Take 10 mg by mouth      montelukast (SINGULAIR) 10 mg tablet Take 10 mg by mouth       No current facility-administered medications for this visit  Allergies   Allergen Reactions    Aspirin Shortness Of Breath    Aspirin Buf([id number]) Chest Pain, Cough and Shortness Of Breath    Naproxen Shortness Of Breath    Penicillins Hives, Itching, Shortness Of Breath and Wheezing    Latex Itching    Hydrocodone-Acetaminophen Rash       Review of Systems    Video Exam    There were no vitals filed for this visit  Physical Exam     I spent 45 minutes directly with the patient during this visit  VIRTUAL VISIT DISCLAIMER    Shelley Justice acknowledges that she has consented to an online visit or consultation  She understands that the online visit is based solely on information provided by her, and that, in the absence of a face-to-face physical evaluation by the physician, the diagnosis she receives is both limited and provisional in terms of accuracy and completeness  This is not intended to replace a full medical face-to-face evaluation by the physician  Shelley Justice understands and accepts these terms

## 2020-08-24 ENCOUNTER — TELEMEDICINE (OUTPATIENT)
Dept: BEHAVIORAL/MENTAL HEALTH CLINIC | Facility: CLINIC | Age: 35
End: 2020-08-24
Payer: COMMERCIAL

## 2020-08-24 DIAGNOSIS — F41.1 GENERALIZED ANXIETY DISORDER: Primary | ICD-10-CM

## 2020-08-24 DIAGNOSIS — F43.10 POST TRAUMATIC STRESS DISORDER (PTSD): ICD-10-CM

## 2020-08-24 PROCEDURE — 90834 PSYTX W PT 45 MINUTES: CPT | Performed by: SOCIAL WORKER
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
## 2020-09-08 ENCOUNTER — TELEMEDICINE (OUTPATIENT)
Dept: BEHAVIORAL/MENTAL HEALTH CLINIC | Facility: CLINIC | Age: 35
End: 2020-09-08
Payer: COMMERCIAL

## 2020-09-08 ENCOUNTER — TELEPHONE (OUTPATIENT)
Dept: PSYCHIATRY | Facility: CLINIC | Age: 35
End: 2020-09-08

## 2020-09-08 DIAGNOSIS — F43.10 POST TRAUMATIC STRESS DISORDER (PTSD): ICD-10-CM

## 2020-09-08 DIAGNOSIS — F41.1 GENERALIZED ANXIETY DISORDER: Primary | ICD-10-CM

## 2020-09-08 PROCEDURE — 90834 PSYTX W PT 45 MINUTES: CPT | Performed by: SOCIAL WORKER

## 2020-09-08 NOTE — PSYCH
Virtual Regular Visit      Assessment/Plan:    Problem List Items Addressed This Visit        Other    Generalized anxiety disorder - Primary    Post traumatic stress disorder (PTSD)               Reason for visit is Reason for visit is Behavioral Health session, conducted through video, due to COVID-19 precautions  Jules Gan has verbalized a preference to continue with virtual sessions at this time  Jules Gan has been offered in-person sessions and has declined  Encounter provider UTE Box    Provider located at [id number] [place] [address]  [address]  [facility] [address]      Recent Visits  No visits were found meeting these conditions  Showing recent visits within past 7 days and meeting all other requirements     Future Appointments  No visits were found meeting these conditions  Showing future appointments within next 150 days and meeting all other requirements        The patient was identified by name and date of birth  Tata Kendrick was informed that this is a telemedicine visit and that the visit is being conducted through Gengo  My office door was closed  No one else was in the room  She acknowledged consent and understanding of privacy and security of the video platform  The patient has agreed to participate and understands they can discontinue the visit at any time  Patient is aware this is a billable service  Subjective  Tata Kendrick is a 37 y o  female  DATA: Met with Jules Gan for scheduled individual session  "I'm tired " She states that her infant daughter has been constipated since a little more than a week  She spoke with a nurse at the [address] [place] [address], who told her that breast fed babies can go a long time without a bowel movement  She has a chiropractor appointment for her daughter tonight, and she hopes that will help with the constipation  She states that Mohini Robles has surgery on Friday to get his third set of ear tubes   She states that her mood remains "fair " She states that there is an increase in her level of anxiety  "I just keep thinking that something bad is going to happen " James Markham states that her daughter and son both go to school on Tuesdays and Thursdays  Her older daughter also goes to school on Fridays  She will have two days with no older children in the home, which she hopes will be helpful to her, as she'll have some time to get her chores and errands done  James Markham states that she recently found out that she cannot automatically have BALTAZAR listed on the birth certificate, due to the fact that she is still legally  to her   She states that she is frustrated with the legal system in [place], as she and BALTAZAR both signed an affidavit that identified that he is the father of the baby  She expressed her frustration with the child custody case with her   She states that her  has not yet completed his assessment, as ordered by the court  She states that he continues to have supervised visits (with his mother as the supervisor)  She expressed frustration with the court system and plans to reach out to her  to see what else can be done regarding the custody situation with her older children  ASSESSMENT: James Markham presents with a primarily euthymic mood  She endorsed some anxiety when her infant daughter experiences discomfort  Her affect is normal and mood-congruent  James Markham exhibits a positive therapeutic rapport with this clinician  James Markham appears to have normal insight and judgment  James Markham continues to exhibit willingness to work on treatment goals and objectives  PLAN: James Markham will return in approximately one month for the next scheduled session; however, she will be on the cancellation wait list for a sooner appointment   Between sessions, James Markham will continue to utilize her mindfulness-based strategies to manage the multiple stressors in her life and will report back during the next session re: successes and barriers  At the next session, this clinician will use client-centered therapy, mindfulness-based strategies, DBT-informed skills and solution-focused therapy to address her mood regulation and anxiety management, in an effort to assist James Markham with meeting treatment goals  HPI     Past Medical History:   Diagnosis Date    Asthma        No past surgical history on file  Current Outpatient Medications   Medication Sig Dispense Refill    beclomethasone (QVAR REDIHALER) 40 MCG/ACT inhaler Inhale 2 puffs 2 (two) times a day      fluticasone (FLONASE) 50 mcg/act nasal spray 2 sprays into each nostril      loratadine (CLARITIN) 10 mg tablet Take 10 mg by mouth      montelukast (SINGULAIR) 10 mg tablet Take 10 mg by mouth       No current facility-administered medications for this visit  Allergies   Allergen Reactions    Aspirin Shortness Of Breath    Aspirin Buf([id number]) Chest Pain, Cough and Shortness Of Breath    Naproxen Shortness Of Breath    Penicillins Hives, Itching, Shortness Of Breath and Wheezing    Latex Itching    Hydrocodone-Acetaminophen Rash       Review of Systems    Video Exam    There were no vitals filed for this visit  Physical Exam     I spent 50 minutes directly with the patient during this visit      VIRTUAL VISIT DISCLAIMER    Apolonia Parnell acknowledges that she has consented to an online visit or consultation  She understands that the online visit is based solely on information provided by her, and that, in the absence of a face-to-face physical evaluation by the physician, the diagnosis she receives is both limited and provisional in terms of accuracy and completeness  This is not intended to replace a full medical face-to-face evaluation by the physician  Apolonia Parnell understands and accepts these terms

## 2020-09-08 NOTE — TELEPHONE ENCOUNTER
Geneva Quezada called and left message stating he has an appointment tonight with Eve Abarca and needs to either cancel or have it earlier   Please call [phone]

## 2020-09-11 ENCOUNTER — TELEPHONE (OUTPATIENT)
Dept: POSTPARTUM | Facility: CLINIC | Age: 35
End: 2020-09-11

## 2020-09-11 NOTE — TELEPHONE ENCOUNTER
Tacho Hamilton called - 3rd baby & not gaining weight well - her Ped's is keeping an eye on that  She had a few questions on latch  She had been a pt of Dr Walsh Aid her older child was seen by Dr Tiffany Rivas at [facility]  She reached out to Dr Ben Lan  & was given our number  Unfortunately we do not accept her insurance at the center  She would like to bounce some questions off of you

## 2020-09-11 NOTE — TELEPHONE ENCOUNTER
Monse's baby is a month old and falling of the curve for weight  Until this point, she has been EBF and Rock Walsh thought things were going well  Rock Walsh has good supply and is able to express 5 ounces per session  Typically, baby was feeding every 2-3 hours on one breast only  About 5 days ago, Blanka Daniel began "cluster feeding"  She will feed hourly for most of the day  She does sleep for 3-4 intervals during the night  She is wetting and soiling diapers as expected  Peds has not recommended supplementing yet but Rock Walsh is concerned that they will soon if Cardwell does not gain weight  Rock Walsh does not wish to give formula and doesn't want to exclusively bottle feed  A weighted feed at the Peds office yesterday showed a transfer of one ounce during that feeding  We discussed the possibility that Blanka Daniel is struggling with milk transfer  I recommended an evaluation with a breastfeeding professional NYA Walsh reports she has left a message for the [facility] in her Peds office  In the meantime, I suggested supplementing with some expressed milk via paced bottle feeding to help increase Brenda's intake  I encouraged Rock Walsh to call back with any additional questions or concerns

## 2020-10-06 ENCOUNTER — TELEMEDICINE (OUTPATIENT)
Dept: BEHAVIORAL/MENTAL HEALTH CLINIC | Facility: CLINIC | Age: 35
End: 2020-10-06
Payer: COMMERCIAL

## 2020-10-06 DIAGNOSIS — F41.1 GENERALIZED ANXIETY DISORDER: Primary | ICD-10-CM

## 2020-10-06 DIAGNOSIS — F43.10 POST TRAUMATIC STRESS DISORDER (PTSD): ICD-10-CM

## 2020-10-06 PROCEDURE — 90834 PSYTX W PT 45 MINUTES: CPT | Performed by: SOCIAL WORKER

## 2020-10-15 ENCOUNTER — TELEMEDICINE (OUTPATIENT)
Dept: BEHAVIORAL/MENTAL HEALTH CLINIC | Facility: CLINIC | Age: 35
End: 2020-10-15
Payer: COMMERCIAL

## 2020-10-15 DIAGNOSIS — F41.1 GENERALIZED ANXIETY DISORDER: Primary | ICD-10-CM

## 2020-10-15 DIAGNOSIS — F43.10 POST TRAUMATIC STRESS DISORDER (PTSD): ICD-10-CM

## 2020-10-15 PROCEDURE — 90834 PSYTX W PT 45 MINUTES: CPT | Performed by: SOCIAL WORKER

## 2020-10-20 ENCOUNTER — TELEMEDICINE (OUTPATIENT)
Dept: BEHAVIORAL/MENTAL HEALTH CLINIC | Facility: CLINIC | Age: 35
End: 2020-10-20
Payer: COMMERCIAL

## 2020-10-20 DIAGNOSIS — F43.10 POST TRAUMATIC STRESS DISORDER (PTSD): ICD-10-CM

## 2020-10-20 DIAGNOSIS — F41.1 GENERALIZED ANXIETY DISORDER: Primary | ICD-10-CM

## 2020-10-20 PROCEDURE — 90834 PSYTX W PT 45 MINUTES: CPT | Performed by: SOCIAL WORKER

## 2020-11-03 ENCOUNTER — TELEMEDICINE (OUTPATIENT)
Dept: BEHAVIORAL/MENTAL HEALTH CLINIC | Facility: CLINIC | Age: 35
End: 2020-11-03
Payer: COMMERCIAL

## 2020-11-03 DIAGNOSIS — F43.10 POST TRAUMATIC STRESS DISORDER (PTSD): ICD-10-CM

## 2020-11-03 DIAGNOSIS — F41.1 GENERALIZED ANXIETY DISORDER: Primary | ICD-10-CM

## 2020-11-03 PROCEDURE — 90834 PSYTX W PT 45 MINUTES: CPT | Performed by: SOCIAL WORKER

## 2020-11-16 ENCOUNTER — TELEMEDICINE (OUTPATIENT)
Dept: BEHAVIORAL/MENTAL HEALTH CLINIC | Facility: CLINIC | Age: 35
End: 2020-11-16
Payer: COMMERCIAL

## 2020-11-16 DIAGNOSIS — F43.10 POST TRAUMATIC STRESS DISORDER (PTSD): ICD-10-CM

## 2020-11-16 DIAGNOSIS — F41.1 GENERALIZED ANXIETY DISORDER: Primary | ICD-10-CM

## 2020-11-16 PROCEDURE — 90834 PSYTX W PT 45 MINUTES: CPT | Performed by: SOCIAL WORKER

## 2020-11-30 ENCOUNTER — TELEMEDICINE (OUTPATIENT)
Dept: BEHAVIORAL/MENTAL HEALTH CLINIC | Facility: CLINIC | Age: 35
End: 2020-11-30
Payer: COMMERCIAL

## 2020-11-30 DIAGNOSIS — F41.1 GENERALIZED ANXIETY DISORDER: Primary | ICD-10-CM

## 2020-11-30 DIAGNOSIS — F43.10 POST TRAUMATIC STRESS DISORDER (PTSD): ICD-10-CM

## 2020-11-30 PROCEDURE — 90834 PSYTX W PT 45 MINUTES: CPT | Performed by: SOCIAL WORKER

## 2020-12-14 ENCOUNTER — TELEMEDICINE (OUTPATIENT)
Dept: BEHAVIORAL/MENTAL HEALTH CLINIC | Facility: CLINIC | Age: 35
End: 2020-12-14
Payer: COMMERCIAL

## 2020-12-14 DIAGNOSIS — F41.1 GENERALIZED ANXIETY DISORDER: Primary | ICD-10-CM

## 2020-12-14 DIAGNOSIS — F43.10 POST TRAUMATIC STRESS DISORDER (PTSD): ICD-10-CM

## 2020-12-14 PROCEDURE — 90834 PSYTX W PT 45 MINUTES: CPT | Performed by: SOCIAL WORKER

## 2021-01-04 ENCOUNTER — TELEMEDICINE (OUTPATIENT)
Dept: BEHAVIORAL/MENTAL HEALTH CLINIC | Facility: CLINIC | Age: 36
End: 2021-01-04
Payer: COMMERCIAL

## 2021-01-04 DIAGNOSIS — F41.1 GENERALIZED ANXIETY DISORDER: Primary | ICD-10-CM

## 2021-01-04 DIAGNOSIS — F43.10 POST TRAUMATIC STRESS DISORDER (PTSD): ICD-10-CM

## 2021-01-04 PROCEDURE — 90834 PSYTX W PT 45 MINUTES: CPT | Performed by: SOCIAL WORKER

## 2021-01-04 NOTE — PSYCH
Virtual Regular Visit    This note was not shared with the patient due to this is a psychotherapy note      Assessment/Plan:    Problem List Items Addressed This Visit        Other    Generalized anxiety disorder - Primary    Post traumatic stress disorder (PTSD)             Reason for visit is Behavioral Health session, conducted through video, due to COVID-19 precautions  Carlos Manuel Sebastian has verbalized a preference to continue with virtual sessions at this time  Carlos Manuel Sebastian has been offered in-person sessions and has declined  Encounter provider UTE Dexter    Provider located at [address] [place] [address] [address]  [facility] [address]      Recent Visits  No visits were found meeting these conditions  Showing recent visits within past 7 days and meeting all other requirements     Future Appointments  No visits were found meeting these conditions  Showing future appointments within next 150 days and meeting all other requirements        The patient was identified by name and date of birth  Ollie Cano was informed that this is a telemedicine visit and that the visit is being conducted through Taulia and patient was informed that this is a secure, HIPAA-compliant platform  She agrees to proceed     My office door was closed  No one else was in the room  She acknowledged consent and understanding of privacy and security of the video platform  The patient has agreed to participate and understands they can discontinue the visit at any time  Patient is aware this is a billable service  Subjective  Ollie Cano is a 37 y o  female  DATA: Met with Carlos Manuel Sebastian for scheduled individual session  Topics of discussion included the health of her child; her step-father's recent surgery; and her relationship with her significant other  Most of the session was spent discussing Monse's infant daughter's medical issues and the stress related to caring for her   She states that her daughter underwent surgery, during which another issue was discovered ("a hole in her esophagus")  Her daughter will be undergoing more testing to determine the exact reason for this and to decide next steps in her treatment  Her daughter has gained approximately 1/2 pound since our last session, which has primarily been since her daughter's surgery  She discussed the anxiety related to the care of her daughter, her inability to return to school at this time, and the additional stress of her step-father's recent surgery  She states that she and her SO are currently getting along well  She states that she continues to work on regaining trust in the relationship  Client shows evidence of utilizing mindfulness and distraction skills to manage mental health symptoms  During this session, this clinician used the following therapeutic modalities: supportive psychotherapy, client-centered therapy, mindfulness-based strategies, DBT-informed skills and solution-focused therapy  Clinician provided psychoeducation regarding use of mindfulness to continue to assist her with anxiety management  ASSESSMENT: Tommie Palomino presents with a primarily euthymic mood  Her affect is normal range and intensity, appropriate  Tommie Palomino exhibits strong therapeutic rapport with this clinician  Tommie Palomino continues to exhibit willingness to work on treatment goals and objectives  Tommie Palomino presents with a minimal risk of suicide, minimal risk of self-harm, and minimal risk of harm to others  PLAN: Tommie Palomino will return in two weeks for the next scheduled session  Between sessions, Tommie Palomino will continue to provide care to her daughter, while also caring for her other children and maintaining her relationship with her SO  She will report back during the next session re: successes and barriers   At the next session, this clinician will use supportive psychotherapy, client-centered therapy, mindfulness-based strategies, DBT-informed skills, Motivational Interviewing and solution-focused therapy to address her mood regulation, anxiety management and relationship concerns, in an effort to assist Yodit Dickson with meeting treatment goals  HPI     Past Medical History:   Diagnosis Date    Asthma        No past surgical history on file  Current Outpatient Medications   Medication Sig Dispense Refill    beclomethasone (QVAR REDIHALER) 40 MCG/ACT inhaler Inhale 2 puffs 2 (two) times a day      fluticasone (FLONASE) 50 mcg/act nasal spray 2 sprays into each nostril      loratadine (CLARITIN) 10 mg tablet Take 10 mg by mouth      montelukast (SINGULAIR) 10 mg tablet Take 10 mg by mouth       No current facility-administered medications for this visit  Allergies   Allergen Reactions    Aspirin Shortness Of Breath    Aspirin Buf([id number]) Chest Pain, Cough and Shortness Of Breath    Naproxen Shortness Of Breath    Penicillins Hives, Itching, Shortness Of Breath and Wheezing    Latex Itching    Hydrocodone-Acetaminophen Rash       Review of Systems    Video Exam    There were no vitals filed for this visit  Physical Exam     I spent 50 minutes directly with the patient during this visit      VIRTUAL VISIT DISCLAIMER    Elisabeth Barboza acknowledges that she has consented to an online visit or consultation  She understands that the online visit is based solely on information provided by her, and that, in the absence of a face-to-face physical evaluation by the physician, the diagnosis she receives is both limited and provisional in terms of accuracy and completeness  This is not intended to replace a full medical face-to-face evaluation by the physician  Elisabeth Barboza understands and accepts these terms

## 2021-01-18 ENCOUNTER — TELEMEDICINE (OUTPATIENT)
Dept: BEHAVIORAL/MENTAL HEALTH CLINIC | Facility: CLINIC | Age: 36
End: 2021-01-18
Payer: COMMERCIAL

## 2021-01-18 DIAGNOSIS — F41.1 GENERALIZED ANXIETY DISORDER: Primary | ICD-10-CM

## 2021-01-18 DIAGNOSIS — F43.10 POST TRAUMATIC STRESS DISORDER (PTSD): ICD-10-CM

## 2021-01-18 PROCEDURE — 90834 PSYTX W PT 45 MINUTES: CPT | Performed by: SOCIAL WORKER

## 2021-01-18 NOTE — PSYCH
Virtual Regular Visit    This note was not shared with the patient due to this is a psychotherapy note        Assessment/Plan:    Problem List Items Addressed This Visit        Other    Generalized anxiety disorder - Primary    Post traumatic stress disorder (PTSD)            Reason for visit is Behavioral Health session, conducted through video, due to COVID-19 precautions  Stacey Sheridan has verbalized a preference to continue with virtual sessions at this time  Stacey Sheridan has been offered in-person sessions and has declined  Encounter provider [facility]    Provider located at [address] [place] [address] [address]  [facility] [id number]      Recent Visits  No visits were found meeting these conditions  Showing recent visits within past 7 days and meeting all other requirements     Future Appointments  No visits were found meeting these conditions  Showing future appointments within next 150 days and meeting all other requirements        The patient was identified by name and date of birth  Jose Anthony was informed that this is a telemedicine visit and that the visit is being conducted through [employer] and patient was informed that this is a secure, HIPAA-compliant platform  She agrees to proceed     My office door was closed  The patient was notified the following individuals were present in the room: Mary Duong, student intern  She acknowledged consent and understanding of privacy and security of the video platform  The patient has agreed to participate and understands they can discontinue the visit at any time  Patient is aware this is a billable service  Subjective  Jose Anthony is a 37 y o  female  DATA: Met with Stacey Sheridan for scheduled individual session   Topics of discussion included daughter's health and upcoming appointments; recent episodes of anxiety; relationship with significant other; frustration with her ex- not following through with his court-ordered evaluation  Agatha Salinas discussed the upcoming testing and plans for her daughter's continued medical treatment  Her daughter continues to gain weight at a very slow pace  She states that the next step is a CT scan, followed by genetic testing  She states that she is working hard to maintain her self-care, as she is caring for her infant daughter's special medical needs, as well as caring for her other two children  She spoke about her relationship with her SO, who has asked her about getting   She states that she has set some firm limits with him, as she continues to struggle with trust in this relationship  She plans to continue to move forward with seeking couple's therapy  At this point, she states that he is not in treatment for his bipolar disorder and is not taking medications  She reports that his symptoms are currently stable  Client shows evidence of utilizing effective boundaries and limits in her relationship with her SO  During this session, this clinician used the following therapeutic modalities: supportive psychotherapy, client-centered therapy, mindfulness-based strategies, DBT-informed skills, Motivational Interviewing and solution-focused therapy  Clinician provided psychoeducation regarding bipolar disorder and the cyclical nature of the symptoms  This session was attended by Milly Moreno, Student Intern  Agatha Salinas provided verbal consent for the Student Intern to sit in on this and future sessions  She acknowledges understanding that she can opt out of student observations/participation at any time  ASSESSMENT: Agatha Salinas presents with a primarily euthymic mood  Her affect is normal range and intensity, appropriate  Agatha Salinas exhibits strong therapeutic rapport with this clinician  Agatha Salinas continues to exhibit willingness to work on treatment goals and objectives  Agatha Salinas presents with a minimal risk of suicide, minimal risk of self-harm, and minimal risk of harm to others  PLAN: Gregorio Farmer will return in two weeks for the next scheduled session  Between sessions, Gregorio Farmer will continue to care for her children and balance her self-care and will report back during the next session re: successes and barriers  At the next session, this clinician will use supportive psychotherapy, client-centered therapy, mindfulness-based strategies, CBT techniques, DBT-informed skills, Motivational Interviewing and solution-focused therapy to address her anxiety and relationship issues, in an effort to assist Gregorio Farmer with meeting treatment goals  HPI     Past Medical History:   Diagnosis Date    Asthma        No past surgical history on file  Current Outpatient Medications   Medication Sig Dispense Refill    beclomethasone (QVAR REDIHALER) 40 MCG/ACT inhaler Inhale 2 puffs 2 (two) times a day      fluticasone (FLONASE) 50 mcg/act nasal spray 2 sprays into each nostril      loratadine (CLARITIN) 10 mg tablet Take 10 mg by mouth      montelukast (SINGULAIR) 10 mg tablet Take 10 mg by mouth       No current facility-administered medications for this visit  Allergies   Allergen Reactions    Aspirin Shortness Of Breath    Aspirin Buf([id number]) Chest Pain, Cough and Shortness Of Breath    Naproxen Shortness Of Breath    Penicillins Hives, Itching, Shortness Of Breath and Wheezing    Latex Itching    Hydrocodone-Acetaminophen Rash       Review of Systems    Video Exam    There were no vitals filed for this visit  Physical Exam     I spent 50 minutes directly with the patient during this visit      VIRTUAL VISIT DISCLAIMER    Josef Jessica acknowledges that she has consented to an online visit or consultation  She understands that the online visit is based solely on information provided by her, and that, in the absence of a face-to-face physical evaluation by the physician, the diagnosis she receives is both limited and provisional in terms of accuracy and completeness   This is not intended to replace a full medical face-to-face evaluation by the physician  Mal Ram understands and accepts these terms

## 2021-02-01 ENCOUNTER — TELEMEDICINE (OUTPATIENT)
Dept: BEHAVIORAL/MENTAL HEALTH CLINIC | Facility: CLINIC | Age: 36
End: 2021-02-01
Payer: COMMERCIAL

## 2021-02-01 DIAGNOSIS — F41.1 GENERALIZED ANXIETY DISORDER: Primary | ICD-10-CM

## 2021-02-01 DIAGNOSIS — F43.10 POST TRAUMATIC STRESS DISORDER (PTSD): ICD-10-CM

## 2021-02-01 PROCEDURE — 90834 PSYTX W PT 45 MINUTES: CPT | Performed by: SOCIAL WORKER

## 2021-02-01 NOTE — PSYCH
Virtual Regular Visit    This note was not shared with the patient due to this is a psychotherapy note        Assessment/Plan:    Problem List Items Addressed This Visit        Other    Generalized anxiety disorder - Primary    Post traumatic stress disorder (PTSD)           Reason for visit is Behavioral Health session, conducted through video, due to COVID-19 precautions  Humphrey Brandon has verbalized a preference to continue with virtual sessions at this time  Humphrey Millering has been offered in-person sessions and has declined  Encounter provider [facility]    Provider located at [address] [place] [id number] [phone]      Recent Visits  No visits were found meeting these conditions  Showing recent visits within past 7 days and meeting all other requirements     Future Appointments  No visits were found meeting these conditions  Showing future appointments within next 150 days and meeting all other requirements        The patient was identified by name and date of birth  Roxanne Cowden was informed that this is a telemedicine visit and that the visit is being conducted through [employer] and patient was informed that this is a secure, HIPAA-compliant platform  She agrees to proceed     My office door was closed  No one else was in the room  She acknowledged consent and understanding of privacy and security of the video platform  The patient has agreed to participate and understands they can discontinue the visit at any time  Patient is aware this is a billable service  Subjective  Roxanne Cowden is a 37 y o  female  DATA: Met with Humphrey Taylor for scheduled individual session  Topics of discussion included stress related to her infant daughter being admitted to the hospital and difficulty with finding childcare for her other two children   Humphrey Millering states that her daughter was planned for admission to [facility] today  They plan to keep her in the hospital for approximately a week, until she begins to gain weight  Di Vizcaino states that she just found out that the individual who was scheduled to care for her children has just backed out of providing care  Di Vizcaino discussed the financial stressors related to this hospitalization  Di Vizcaino states that he has been very supportive and has agreed that, if they cannot get childcare for the other children, he will care for the other two children  During this session, the  came in to meet with Di Vizcaino  We ended the session and agreed to complete later  Called client at the end of the day, and she was able to reconnect to the Teams Platform to complete our session  She states that the  was able to assist with some of the financial issues (e g , paying for [employer] food while at the hospital)  Di Vizcaino also spoke with her childcare provider  She is not able to cover the time, due to a covid exposure  Monse's boyfriend will be providing primary care to the other children while Di Vizacino remains at the hospital  Client shows evidence of utilizing basic mindfulness-based skills to manage mental health symptoms  During this session, this clinician used the following therapeutic modalities: supportive psychotherapy, client-centered therapy, mindfulness-based strategies, DBT-informed skills, Motivational Interviewing and solution-focused therapy  ASSESSMENT: Di Vizcaino presents with a primarily euthymic mood  Her affect is normal range and intensity, appropriate  Di Vizcaino exhibits strong therapeutic rapport with this clinician  Di Vizcaino continues to exhibit willingness to work on treatment goals and objectives  Di Vizcaino presents with a minimal risk of suicide, minimal risk of self-harm, and minimal risk of harm to others  PLAN: Di Vizcaino will return in two weeks for the next scheduled session   Between sessions, Di Vizcaino will continue to monitor her moods and utilize her natural support system to help her during her daughter's hospitalization and will report back during the next session re: successes and barriers  At the next session, this clinician will use supportive psychotherapy, client-centered therapy, mindfulness-based strategies, DBT-informed skills, Motivational Interviewing and solution-focused therapy to address her mood regulation and relationship issues, in an effort to assist Chichi with meeting treatment goals  HPI     Past Medical History:   Diagnosis Date    Asthma        No past surgical history on file  Current Outpatient Medications   Medication Sig Dispense Refill    beclomethasone (QVAR REDIHALER) 40 MCG/ACT inhaler Inhale 2 puffs 2 (two) times a day      fluticasone (FLONASE) 50 mcg/act nasal spray 2 sprays into each nostril      loratadine (CLARITIN) 10 mg tablet Take 10 mg by mouth      montelukast (SINGULAIR) 10 mg tablet Take 10 mg by mouth       No current facility-administered medications for this visit  Allergies   Allergen Reactions    Aspirin Shortness Of Breath    Aspirin Buf([id number]-[id number]) Chest Pain, Cough and Shortness Of Breath    Naproxen Shortness Of Breath    Penicillins Hives, Itching, Shortness Of Breath and Wheezing    Latex Itching    Hydrocodone-Acetaminophen Rash       Review of Systems    Video Exam    There were no vitals filed for this visit  Physical Exam     I spent 50 minutes directly with the patient during this visit      VIRTUAL VISIT DISCLAIMER    Sydney He acknowledges that she has consented to an online visit or consultation  She understands that the online visit is based solely on information provided by her, and that, in the absence of a face-to-face physical evaluation by the physician, the diagnosis she receives is both limited and provisional in terms of accuracy and completeness  This is not intended to replace a full medical face-to-face evaluation by the physician   Sydney He understands and accepts these terms

## 2021-02-15 ENCOUNTER — TELEMEDICINE (OUTPATIENT)
Dept: BEHAVIORAL/MENTAL HEALTH CLINIC | Facility: CLINIC | Age: 36
End: 2021-02-15
Payer: COMMERCIAL

## 2021-02-15 DIAGNOSIS — F43.10 POST TRAUMATIC STRESS DISORDER (PTSD): ICD-10-CM

## 2021-02-15 DIAGNOSIS — F41.1 GENERALIZED ANXIETY DISORDER: Primary | ICD-10-CM

## 2021-02-15 PROCEDURE — 90834 PSYTX W PT 45 MINUTES: CPT | Performed by: SOCIAL WORKER

## 2021-02-17 NOTE — PSYCH
Virtual Regular Visit    This note was not shared with the patient due to this is a psychotherapy note      Assessment/Plan:    Problem List Items Addressed This Visit        Other    Generalized anxiety disorder - Primary    Post traumatic stress disorder (PTSD)           Reason for visit is Behavioral Health session, conducted through video, due to COVID-19 precautions  Annel Mcdonough has verbalized a preference to continue with virtual sessions at this time  Annel Mcdonough has been offered in-person sessions and has declined  Encounter provider [facility]    Provider located at [address] [place] [id number] [phone]      Recent Visits  No visits were found meeting these conditions  Showing recent visits within past 7 days and meeting all other requirements     Future Appointments  No visits were found meeting these conditions  Showing future appointments within next 150 days and meeting all other requirements        The patient was identified by name and date of birth  Bry Reza was informed that this is a telemedicine visit and that the visit is being conducted through [employer] and patient was informed that this is a secure, HIPAA-compliant platform  She agrees to proceed     My office door was closed  No one else was in the room  She acknowledged consent and understanding of privacy and security of the video platform  The patient has agreed to participate and understands they can discontinue the visit at any time  Patient is aware this is a billable service  Subjective  Bry Reza is a 37 y o  female  DATA: Met with Annel Mcdonough for scheduled individual session  Topics of discussion included care of her infant daughter; managing her children during covid; relationship with her fiance; health status of her stepfahter   Annel Mcdonough reports that her daughter has gained some weight since her hospitalization  She states that the feeding schedule was changed, and she believes this was helpful  She spoke about her relationship with one of the doctors and her perception that he was "blaming" her for her daughter's inability to gain weight  She states that she was very upset by this implication  We discussed other stressors in her life and ways that she can work on finding balance in her life  She is caring for three children, during a pandemic  Her step-father's health is also a stressor  She states that there was some good news about his health, but there are still serious issues  We discussed her relationship and trust issues with her boyfriend  She continues to ask him to wait regarding pushing marriage  She states they had an argument about it recently, but she is maintaining her position  Client shows evidence of utilizing assertiveness skills to manage mental health symptoms  During this session, this clinician used the following therapeutic modalities: supportive psychotherapy, client-centered therapy, mindfulness-based strategies, DBT-informed skills, Motivational Interviewing and solution-focused therapy  ASSESSMENT: Kleber Narayanan presents with a primarily euthymic mood  Her affect is normal range and intensity, appropriate  Kleber Narayanan exhibits good therapeutic rapport with this clinician  Kleber Narayanan continues to exhibit willingness to work on treatment goals and objectives  Kleber Narayanan presents with a minimal risk of suicide, minimal risk of self-harm, and minimal risk of harm to others  PLAN: Kleber Narayanan will return in two weeks for the next scheduled session  Between sessions, Kleber Narayanan will continue to work on finding a way to balance her life and care for her numerous responsibilities and will report back during the next session re: successes and barriers   At the next session, this clinician will use supportive psychotherapy, client-centered therapy, mindfulness-based strategies, DBT-informed skills, Motivational Interviewing and solution-focused therapy to address her mood regulation and relationship issues, in an effort to assist Miguel Angel Thapa with meeting treatment goals  HPI     Past Medical History:   Diagnosis Date    Asthma        No past surgical history on file  Current Outpatient Medications   Medication Sig Dispense Refill    beclomethasone (QVAR REDIHALER) 40 MCG/ACT inhaler Inhale 2 puffs 2 (two) times a day      fluticasone (FLONASE) 50 mcg/act nasal spray 2 sprays into each nostril      loratadine (CLARITIN) 10 mg tablet Take 10 mg by mouth      montelukast (SINGULAIR) 10 mg tablet Take 10 mg by mouth       No current facility-administered medications for this visit  Allergies   Allergen Reactions    Aspirin Shortness Of Breath    Aspirin Buf([id number]) Chest Pain, Cough and Shortness Of Breath    Naproxen Shortness Of Breath    Penicillins Hives, Itching, Shortness Of Breath and Wheezing    Latex Itching    Hydrocodone-Acetaminophen Rash       Review of Systems    Video Exam    There were no vitals filed for this visit  Physical Exam     I spent 50 minutes directly with the patient during this visit      VIRTUAL VISIT DISCLAIMER    Shelby Kerr acknowledges that she has consented to an online visit or consultation  She understands that the online visit is based solely on information provided by her, and that, in the absence of a face-to-face physical evaluation by the physician, the diagnosis she receives is both limited and provisional in terms of accuracy and completeness  This is not intended to replace a full medical face-to-face evaluation by the physician  Shelby Kerr understands and accepts these terms

## 2021-03-01 ENCOUNTER — TELEMEDICINE (OUTPATIENT)
Dept: BEHAVIORAL/MENTAL HEALTH CLINIC | Facility: CLINIC | Age: 36
End: 2021-03-01
Payer: COMMERCIAL

## 2021-03-01 DIAGNOSIS — F43.10 POST TRAUMATIC STRESS DISORDER (PTSD): ICD-10-CM

## 2021-03-01 DIAGNOSIS — F41.1 GENERALIZED ANXIETY DISORDER: Primary | ICD-10-CM

## 2021-03-01 PROCEDURE — 90834 PSYTX W PT 45 MINUTES: CPT | Performed by: SOCIAL WORKER

## 2021-03-01 NOTE — PSYCH
Virtual Regular Visit    This note was not shared with the patient due to this is a psychotherapy note    Assessment/Plan:    Problem List Items Addressed This Visit        Other    Generalized anxiety disorder - Primary    Post traumatic stress disorder (PTSD)           Reason for visit is Behavioral Health session, conducted through video, due to COVID-19 precautions  Flaco Miramontes has verbalized a preference to continue with virtual sessions at this time  Flaco Miramontes has been offered in-person sessions and has declined  Encounter provider [facility] Ma    Provider located at [address] [place] [id number] [phone]      Recent Visits  No visits were found meeting these conditions  Showing recent visits within past 7 days and meeting all other requirements     Future Appointments  No visits were found meeting these conditions  Showing future appointments within next 150 days and meeting all other requirements        The patient was identified by name and date of birth  Naima Fabian was informed that this is a telemedicine visit and that the visit is being conducted through [employer] and patient was informed that this is a secure, HIPAA-compliant platform  She agrees to proceed     My office door was closed  No one else was in the room  She acknowledged consent and understanding of privacy and security of the video platform  The patient has agreed to participate and understands they can discontinue the visit at any time  Patient is aware this is a billable service  Subjective  Naima Fabian is a 37 y o  female  DATA: Met with Flaco Miramontes for scheduled individual session  Topics of discussion included health of her child; relationship with her SO; hobbies and activities  Flaco Miramontes states that her daughter is making progress with gaining weight   She states that she continues to be mildly concerned about her one doctor alluding to her being responsible for her daughter not gaining weight  She states that she is still waiting for some of the genetic testing to come back  Stacey Sheridan discussed her relationship with her SO  She continues to set limits/boundaries with him regarding marriage  She states that she will not consider marriage until she knows that he understands the importance of trust in their relationship  She states that he agreed to get a telephone for which no one else would have the number  She states that she does not feel like he understands the underlying trust issues that she is concerned about  Client shows evidence of utilizing distraction skills (including engaging in hobby activities) to manage mental health symptoms  During this session, this clinician used the following therapeutic modalities: supportive psychotherapy, client-centered therapy, mindfulness-based strategies, DBT-informed skills, Motivational Interviewing and solution-focused therapy  ASSESSMENT: Stacey Sheridan presents with a primarily euthymic mood  Her affect is normal range and intensity, appropriate  Stacey Sheridan exhibits strong therapeutic rapport with this clinician  Stacey Sheridan continues to exhibit willingness to work on treatment goals and objectives  Stacey Sheridan presents with a minimal risk of suicide, minimal risk of self-harm, and minimal risk of harm to others  PLAN: Stacey Sheridan will return in two weeks for the next scheduled session  Between sessions, Stacey Sheridan will continue to  and will report back during the next session re: successes and barriers  At the next session, this clinician will use supportive psychotherapy, client-centered therapy, mindfulness-based strategies, DBT-informed skills, Motivational Interviewing and solution-focused therapy to address her mood regulation and relationship concerns, in an effort to assist Stacey Sheridan with meeting treatment goals           HPI     Past Medical History:   Diagnosis Date    Asthma        No past surgical history on file  Current Outpatient Medications   Medication Sig Dispense Refill    beclomethasone (QVAR REDIHALER) 40 MCG/ACT inhaler Inhale 2 puffs 2 (two) times a day      fluticasone (FLONASE) 50 mcg/act nasal spray 2 sprays into each nostril      loratadine (CLARITIN) 10 mg tablet Take 10 mg by mouth      montelukast (SINGULAIR) 10 mg tablet Take 10 mg by mouth       No current facility-administered medications for this visit  Allergies   Allergen Reactions    Aspirin Shortness Of Breath    Aspirin Buf(Mporl-Rvjaz-Vyjzw) Chest Pain, Cough and Shortness Of Breath    Naproxen Shortness Of Breath    Penicillins Hives, Itching, Shortness Of Breath and Wheezing    Latex Itching    Hydrocodone-Acetaminophen Rash       Review of Systems    Video Exam    There were no vitals filed for this visit  Physical Exam     I spent 50  minutes directly with the patient during this visit      VIRTUAL VISIT DISCLAIMER    No Freitas acknowledges that she has consented to an online visit or consultation  She understands that the online visit is based solely on information provided by her, and that, in the absence of a face-to-face physical evaluation by the physician, the diagnosis she receives is both limited and provisional in terms of accuracy and completeness  This is not intended to replace a full medical face-to-face evaluation by the physician  No Freitas understands and accepts these terms

## 2021-03-15 ENCOUNTER — TELEMEDICINE (OUTPATIENT)
Dept: BEHAVIORAL/MENTAL HEALTH CLINIC | Facility: CLINIC | Age: 36
End: 2021-03-15
Payer: COMMERCIAL

## 2021-03-15 DIAGNOSIS — F43.10 POST TRAUMATIC STRESS DISORDER (PTSD): ICD-10-CM

## 2021-03-15 DIAGNOSIS — F41.1 GENERALIZED ANXIETY DISORDER: Primary | ICD-10-CM

## 2021-03-15 PROCEDURE — 90834 PSYTX W PT 45 MINUTES: CPT | Performed by: SOCIAL WORKER

## 2021-03-15 NOTE — PSYCH
Virtual Regular Visit    This note was not shared with the patient due to this is a psychotherapy note    Assessment/Plan:    Problem List Items Addressed This Visit        Other    Generalized anxiety disorder - Primary    Post traumatic stress disorder (PTSD)           Reason for visit is Behavioral Health session, conducted through video, due to COVID-19 precautions  Gabino Cabral has verbalized a preference to continue with virtual sessions at this time  Gabino Cabral has been offered in-person sessions and has declined  Encounter provider [facility]    Provider located at [address] [place] [id number] [phone]      Recent Visits  No visits were found meeting these conditions  Showing recent visits within past 7 days and meeting all other requirements     Future Appointments  No visits were found meeting these conditions  Showing future appointments within next 150 days and meeting all other requirements        The patient was identified by name and date of birth  No Freitas was informed that this is a telemedicine visit and that the visit is being conducted through [employer] and patient was informed that this is a secure, HIPAA-compliant platform  She agrees to proceed     My office door was closed  The patient was notified the following individuals were present in the room: Stacy Chavarria, student intern  She acknowledged consent and understanding of privacy and security of the video platform  The patient has agreed to participate and understands they can discontinue the visit at any time  Patient is aware this is a billable service  Subjective  No Freitas is a 37 y o  female  DATA: Met with Gabino Cabral for scheduled individual session  This session was attended by Stacy Chavarria, Student Intern  Gabino Cabral provided verbal consent for the Student Intern to sit in on this and future sessions  She acknowledges understanding that she can opt out of student observations/participation at any time  Topics of discussion included Ling baby's physical health issues and Monse's relationship with her Veatrice Devin explained that she feels that one of her doctors is intimating that she is responsible for her daughter not gaining weight  Yodit Dickson stated that the doctors stated that her baby gained weight when she was in the hospital; but when she gets home, her weight gain slows down  Yodit Dickson stated that they are waiting for results from genetic testing; however, these results will take a long time to come back in  At this point her baby still has a NG tube to supplement feedings  Yodit Dickson spent significant time in this session discussing her relationship with her SO  Initially, she did not want to talk about the situation; however, she recognizes that she needs to discuss the situation, so she can make some decisions regarding what to do  Yodit Dickson stated that she found out that her fiancé has been talking to another woman on social media and is planning to meet up with her at a hotel  At this point, Yodit Dickson is not sure how to handle the situation  She feels that she is financially dependent upon him for the care of herself and her children  She states that she has no local supports and cannot return to work because of her baby's medical needs  Yodit Dickson agreed to check in with this clinician if she confronts her SO  During this session, this clinician used the following therapeutic modalities: client-centered therapy; solution focused techniques; DBT-informed techniques; Motivational Interviewing  The clinician assigned the following for the client to complete prior to the next session: calling her for an update regarding todays situation  ASSESSMENT: Yodit Dickson presents with an anxious mood  Ling affect is congruent with mood do to her uncertainty regarding her situation with her fiancé   Yodit Dickson exhibits a good rapport with this clinician  Di Vizcaino continues to exhibit willingness to work on treatment goals and objectives  Di Vizcaino presents with a minimal risk of suicide, minimal risk of self-harm, and minimal risk of harm to others  PLAN: Di Vizcaino will return in two weeks for the next scheduled session  Between sessions, Di Vizcaino will continue to monitor her moods and practice distress tolerance skills to maintain her mood and will report back during the next session re: successes and barriers  At the next session, this clinician will use client-centered & solution focus to address Ling relationship concerns, in an effort to assist Di Vizcaino with meeting treatment goals  HPI     Past Medical History:   Diagnosis Date    Asthma        No past surgical history on file  Current Outpatient Medications   Medication Sig Dispense Refill    beclomethasone (QVAR REDIHALER) 40 MCG/ACT inhaler Inhale 2 puffs 2 (two) times a day      fluticasone (FLONASE) 50 mcg/act nasal spray 2 sprays into each nostril      loratadine (CLARITIN) 10 mg tablet Take 10 mg by mouth      montelukast (SINGULAIR) 10 mg tablet Take 10 mg by mouth       No current facility-administered medications for this visit  Allergies   Allergen Reactions    Aspirin Shortness Of Breath    Aspirin Buf([id number]) Chest Pain, Cough and Shortness Of Breath    Naproxen Shortness Of Breath    Penicillins Hives, Itching, Shortness Of Breath and Wheezing    Latex Itching    Hydrocodone-Acetaminophen Rash       Review of Systems    Video Exam    There were no vitals filed for this visit  Physical Exam     I spent 50 minutes directly with the patient during this visit      VIRTUAL VISIT DISCLAIMER    Manisha Mak acknowledges that she has consented to an online visit or consultation   She understands that the online visit is based solely on information provided by her, and that, in the absence of a face-to-face physical evaluation by the physician, the diagnosis she receives is both limited and provisional in terms of accuracy and completeness  This is not intended to replace a full medical face-to-face evaluation by the physician  Ollie Cano understands and accepts these terms

## 2021-03-29 ENCOUNTER — TELEMEDICINE (OUTPATIENT)
Dept: BEHAVIORAL/MENTAL HEALTH CLINIC | Facility: CLINIC | Age: 36
End: 2021-03-29
Payer: COMMERCIAL

## 2021-03-29 DIAGNOSIS — F41.1 GENERALIZED ANXIETY DISORDER: Primary | ICD-10-CM

## 2021-03-29 DIAGNOSIS — F43.10 POST TRAUMATIC STRESS DISORDER (PTSD): ICD-10-CM

## 2021-03-29 PROCEDURE — 90834 PSYTX W PT 45 MINUTES: CPT | Performed by: SOCIAL WORKER

## 2021-03-29 NOTE — PSYCH
Virtual Regular Visit    This note was not shared with the patient due to this is a psychotherapy note    Assessment/Plan:    Problem List Items Addressed This Visit        Other    Generalized anxiety disorder - Primary    Post traumatic stress disorder (PTSD)             Reason for visit is Behavioral Health session, conducted through video, due to COVID-19 precautions  Flaco Miramontes has verbalized a preference to continue with virtual sessions at this time  Flaco Miramontes has been offered in-person sessions and has declined  Encounter provider [facility]    Provider located at [address] [place] [id number] [phone]      Recent Visits  No visits were found meeting these conditions  Showing recent visits within past 7 days and meeting all other requirements     Today's Visits  Date Type Provider Dept   03/29/21 [address] today's visits and meeting all other requirements     Future Appointments  No visits were found meeting these conditions  Showing future appointments within next 150 days and meeting all other requirements        The patient was identified by name and date of birth  Naima Fabian was informed that this is a telemedicine visit and that the visit is being conducted through [employer] and patient was informed that this is a secure, HIPAA-compliant platform  She agrees to proceed     My office door was closed  No one else was in the room  She acknowledged consent and understanding of privacy and security of the video platform  The patient has agreed to participate and understands they can discontinue the visit at any time  Patient is aware this is a billable service  Subjective  Naima Fabian is a 37 y o  female  DATA: Met with Flaco Miramontes for scheduled individual session   Topics of discussion included her daughter's current medical issues; mood regulation; relationship issues with her SO  When Nata Medrano signed into her Teams meeting, she used the name "Charisma Roldan is home today;" therefore, this clinician took great care to ensure her physical and emotional safety prior to discussing any topics that involved her SO  She did explain that she felt that, during our conversation, she did have privacy  She just wanted to make sure that I was aware that she might have to change the subject if we were discussing the issues in their relationship  Nata Medrano was able to share that she has not confronted him about her recent discovery of him talking with another woman through social media  She states some ambivalence about bringing this up, as she does not want to start a conflict  Nata Medrano states that her daughter has been having diarrhea consistently for a couple weeks  She also is recovering from an upper respiratory infection (she tested negative for covid)  Nata Medrano states that her daughter will have to undergo more testing if the diarrhea continues  She states that her daughter has not gained any weight, but also she has not lost any weight  Overall, Nata Medrano states that her moods have remained stable  We spent some time reviewing and revising her treatment plan  She states that she feels that therapy continues to help her to learn how to manage her moods and set/maintain limits in her relationships  Client shows evidence of utilizing some DBT-informed skills (distraction, effective communication) to manage mental health symptoms  During this session, this clinician used the following therapeutic modalities: supportive psychotherapy, client-centered therapy, mindfulness-based strategies, DBT-informed skills, Motivational Interviewing and solution-focused therapy  Nata Medrano completed a PHQ-9 (score for mild depression) and a DYLON-7 (score for moderate anxiety)  These screenings are attached to this service        ASSESSMENT: Nata Medrano presents with a primarily euthymic mood  her affect is normal range and intensity, appropriate  Juancarlos Hurst exhibits good therapeutic rapport with this clinician  Juancarlos Hurst continues to exhibit willingness to work on treatment goals and objectives  Juancarlos Hurst presents with a minimal risk of suicide, minimal risk of self-harm, and minimal risk of harm to others  PLAN: Juancarlos Hurst will return in two weeks for the next scheduled session  Between sessions, Juancarlos Hurst will continue to set and maintain boundaries in her current relationship  She will practice mindfulness-based strategies to manage her anxiety and will report back during the next session re: successes and barriers  At the next session, this clinician will use supportive psychotherapy, client-centered therapy, mindfulness-based strategies, DBT-informed skills and solution-focused therapy to address her mood regulation and relationship issues, in an effort to assist Juancarlos Hurst with meeting treatment goals  HPI     Past Medical History:   Diagnosis Date    Asthma        No past surgical history on file  Current Outpatient Medications   Medication Sig Dispense Refill    beclomethasone (QVAR REDIHALER) 40 MCG/ACT inhaler Inhale 2 puffs 2 (two) times a day      fluticasone (FLONASE) 50 mcg/act nasal spray 2 sprays into each nostril      loratadine (CLARITIN) 10 mg tablet Take 10 mg by mouth      montelukast (SINGULAIR) 10 mg tablet Take 10 mg by mouth       No current facility-administered medications for this visit  Allergies   Allergen Reactions    Aspirin Shortness Of Breath    Aspirin Buf([id number]) Chest Pain, Cough and Shortness Of Breath    Naproxen Shortness Of Breath    Penicillins Hives, Itching, Shortness Of Breath and Wheezing    Latex Itching    Hydrocodone-Acetaminophen Rash       Review of Systems    Video Exam    There were no vitals filed for this visit      Physical Exam     I spent 50 minutes directly with the patient during this visit      VIRTUAL VISIT DISCLAIMER    Tashia Reis acknowledges that she has consented to an online visit or consultation  She understands that the online visit is based solely on information provided by her, and that, in the absence of a face-to-face physical evaluation by the physician, the diagnosis she receives is both limited and provisional in terms of accuracy and completeness  This is not intended to replace a full medical face-to-face evaluation by the physician  Tashia Reis understands and accepts these terms

## 2021-03-29 NOTE — BH TREATMENT PLAN
Tashia Reis  1985         Date of Initial Treatment Plan: March 27, 2019   Date of Current Treatment Plan: March 29, 2021     Treatment Plan Number 6      Strengths/Personal Resources for Self Care: motivation to be a good mother; positive therapeutic relationship; stable housing; support from her mother; motivation to complete education     Diagnosis:   1  Generalized anxiety disorder      2  Post traumatic stress disorder (PTSD)            Area of Needs: anxiety and PTSD      Long Term Goal 1: "I will be able to manage stressful situations without having a panic attack  "     Target Date:                                             July 27, 2021  Treatment Plan Expiration Date:           September 25, 2021  Completion Date:                           To be determined         Short Term Objectives for Goal 1:               6  Laurier Bernheim identify triggers and prompting events that increase symptoms of anxiety  Update 3/29/2021: Cassandra Barber exhibits the ability to identify some of the triggers/prompting events that increase her levels of anxiety  She states that she has significant anxiety regarding the health and wellbeing of her children (e g , Cassandra Barber identifies that she has both realistic and unrealistic fears that something terrible will happen to her infant daughter)  Cassandra Barber identifies that any interactions with her ex- increase her levels of anxiety  She also identifies that her relationship with her SO can, at times, increase her anxiety               7  Laurier Bernheim learn and exhibit understanding of a minimum of three distress tolerance skills to assist with symptom reduction    Update 3/29/2021: Cassandra Barber states that she is using distraction ("focusing on keeping busy") to manage her anxiety  She states this is her primary distress tolerance skill  She states that she does note that breathing exercises (deep breathing, counting) is helpful at times   We will continue to work on developing her use of DBT-informed distress tolerance skills                5  Kitty Bustos learn and exhibit understanding of effective communication skills (using a DBT-informed perspective)  Update 3/29/2021: Silvio Jackson states that she is "not doing as well as I should be doing " She states that she gets nervous about talking about things that might cause confrontations  She continues to have difficulty determining when she is making a "wise mind" decision about when to bring something up and when to avoid it                           9  Kitty Bustos identify and maintain personal limits and boundaries in relationships with her significant other  Any boundary crossings will be discussed in therapy sessions  Update 3/29/2021: Silvio Jackson states that she has set a 1-year limit with her SO regarding discussing marriage  She states that she is also practicing maintaining limits with her children regarding any punishments/limits she sets with them  She states, "It's still hard, but I'm getting better at it "              0  Silvio Jackson will identify and discuss emotions related to her decisions and boundaries within this relationship  She will process these emotions in sessions      Update 3/29/2021: Silvio Jackson states that she identifies that this is a difficult objective  "I desperately want everything to work out  I want to be able to trust him  I want to be there  I want to know that I did everything I could to make our relationship work  I still really love him and want to be with him  I just want to trust him " Silvio Jackson states, "I'm happy more often than I'm not " She states there is still "room to improve "              7  Silvio Jackson will identify the origins of her guilt and will process how her guilt leads to her remaining in unhealthy situations    Update 3/29/2021: Silvio Jackson states that she is able to identify how the guilt from her previous relationship impacts her current relationship   She states, "I think that my past relationship affects my ability to accept and share my feelings " She states that she is working on identifying that they can have a disagreement without it blowing up into a huge fight                       7  When appropriate, the clinician and Monse will begin to identify target areas to explore and process past trauma that is effecting current relationships and functioning    End-dated 3/29/2021: Nata Medrano will begin to work on trauma processing once she is able to return to in-person sessions                                        8  Clinician will provide psychoeducation regarding options for processing past trauma (including, but not limited to, prolonged exposure, bilateral stimulation)    End-dated 3/29/2021: Nata Medrano will begin to work on trauma processing once she is able to return to in-person sessions                    9  Monse will decrease her level of depression and anxiety-- we will strive for a "mild" level of both depression and anxiety--with use of the PHQ-9 and DYLON-7  Updated 3/29/2021: Nata Medrano scored a 7 on the PHQ-9 (mild depression) and a 12 on the DYLON-7 (moderate anxiety)                          GOAL 1: Modality: Individual 1-2x per month   Completion Date to be determined and The person(s) responsible for carrying out the plan is Alissa (client) and Maynor Dye (therapy)      Clinician will use client-centered therapy, mindfulness-based strategies, DBT-informed skills and solution-focused therapy to address Monse's symptoms of anxiety and PTSD  Monse will practice skills between sessions and will report back, during subsequent sessions regarding successes and barriers       Behavioral Health Treatment Plan  [place]: Diagnosis and Treatment Plan explained to Monse, Mnose relates understanding diagnosis and is agreeable to Treatment Plan          Client Comments : Please share your thoughts, feelings, need and/or experiences regarding your treatment plan: "I think it's a good treatment plan "     This treatment plan was created between this clinician and this client on 3/29/21 @ 12:52pm  Due to the current COVID-19 precautions, this treatment plan was created during a Virtual Visit (through the use of [employer])  The client provided verbal consent at the time of the actual session  The treatment plan was transcribed into the Electronic Health Record at a later date

## 2021-04-12 ENCOUNTER — TELEMEDICINE (OUTPATIENT)
Dept: BEHAVIORAL/MENTAL HEALTH CLINIC | Facility: CLINIC | Age: 36
End: 2021-04-12
Payer: COMMERCIAL

## 2021-04-12 DIAGNOSIS — F41.1 GENERALIZED ANXIETY DISORDER: Primary | ICD-10-CM

## 2021-04-12 DIAGNOSIS — F43.10 POST TRAUMATIC STRESS DISORDER (PTSD): ICD-10-CM

## 2021-04-12 PROCEDURE — 90834 PSYTX W PT 45 MINUTES: CPT | Performed by: SOCIAL WORKER

## 2021-04-12 NOTE — PSYCH
Virtual Regular Visit    This note was not shared with the patient due to this is a psychotherapy note        Assessment/Plan:    Problem List Items Addressed This Visit        Other    Generalized anxiety disorder - Primary    Post traumatic stress disorder (PTSD)               Reason for visit is Behavioral Health session, conducted through video, due to COVID-19 precautions  Huang Mcelroy has verbalized a preference to continue with virtual sessions at this time  Huang Mcelroy has been offered in-person sessions and has declined  Encounter provider [facility]    Provider located at [address] [place] [id number] [phone]      Recent Visits  No visits were found meeting these conditions  Showing recent visits within past 7 days and meeting all other requirements     Future Appointments  No visits were found meeting these conditions  Showing future appointments within next 150 days and meeting all other requirements        The patient was identified by name and date of birth  Shelley Justice was informed that this is a telemedicine visit and that the visit is being conducted through [employer] and patient was informed that this is a secure, HIPAA-compliant platform  She agrees to proceed     My office door was closed  The patient was notified the following individuals were present in the room: Tab Bansal student intern  She acknowledged consent and understanding of privacy and security of the video platform  The patient has agreed to participate and understands they can discontinue the visit at any time  Patient is aware this is a billable service  Subjective  Shelley Justice is a 37 y o  female  Goals addressed: Goal #1    DATA: Met with Huang Mcelroy for scheduled individual session  This session was attended by Tab Bansal, Student Intern   Huang Mcelroy provided verbal consent for the Student Intern to sit in on this and future sessions  She acknowledges understanding that she can opt out of student observations/participation at any time  Topics of discussion included her relationship with her SO, her decision to file for divorce from her ex-, and parenting stressors  Jules Gan discussed her decision to confront her fiance about his discussions with other women  She has set a boundary with him, stating that she will not consider marrying him until he has shown her that he is trustworthy for at least one year  She states that she does want to maintain the relationship, and she feels that she can tell that he is making an attempt to be more open and honest with her  Jules Gan states that she made a decision to move forward with filing for divorce  She states that she feels good about making the first step toward finalizing the divorce  Jules Gan discussed some of the issues related to parenting  She states that her infant daughter continues to gain some weight  Her son is going to start behavioral therapy today  At this point, Jules Gan states that her anxiety is not too bad; however, she reports that she has been having frequent anxiety attacks, for which she is not able to identify a trigger or prompting event  She states that, when these anxiety attacks occur, the level of anxiety goes immediately from 0 to 10  This clinician has asked her to start to track her anxiety, when the attacks are happening, what is happening, and how long they last      ASSESSMENT: Jules Gan presents with a primarily euthymic mood  Jules Gan has a full-range of affect and is congruent with mood  Jules Gan exhibits a good rapport with this clinician  Jules Gan continues to exhibit willingness to work on treatment goals and objectives  Jules Gan presents with a minimal risk of suicide, minimal risk of self-harm, and minimal risk of harm to others  PLAN: Jules Gan will return in two weeks for the next scheduled session   Between sessions, Jules Gan will begin to track any incidents of anxiety and will report back during the next session re: successes and barriers  At the next session, this clinician will use solution-focus and client-centered to address her current relationship concerns, in an effort to assist Chichi with meeting treatment goals  HPI     Past Medical History:   Diagnosis Date    Asthma        No past surgical history on file  Current Outpatient Medications   Medication Sig Dispense Refill    beclomethasone (QVAR REDIHALER) 40 MCG/ACT inhaler Inhale 2 puffs 2 (two) times a day      fluticasone (FLONASE) 50 mcg/act nasal spray 2 sprays into each nostril      loratadine (CLARITIN) 10 mg tablet Take 10 mg by mouth      montelukast (SINGULAIR) 10 mg tablet Take 10 mg by mouth       No current facility-administered medications for this visit  Allergies   Allergen Reactions    Aspirin Shortness Of Breath    Aspirin Buf([id number]) Chest Pain, Cough and Shortness Of Breath    Naproxen Shortness Of Breath    Penicillins Hives, Itching, Shortness Of Breath and Wheezing    Latex Itching    Hydrocodone-Acetaminophen Rash       Review of Systems    Video Exam    There were no vitals filed for this visit  Physical Exam     I spent 50 minutes directly with the patient during this visit      VIRTUAL VISIT DISCLAIMER    Briana Hassan acknowledges that she has consented to an online visit or consultation  She understands that the online visit is based solely on information provided by her, and that, in the absence of a face-to-face physical evaluation by the physician, the diagnosis she receives is both limited and provisional in terms of accuracy and completeness  This is not intended to replace a full medical face-to-face evaluation by the physician  Briana Hassan understands and accepts these terms

## 2021-04-26 ENCOUNTER — TELEMEDICINE (OUTPATIENT)
Dept: BEHAVIORAL/MENTAL HEALTH CLINIC | Facility: CLINIC | Age: 36
End: 2021-04-26
Payer: COMMERCIAL

## 2021-04-26 DIAGNOSIS — F43.10 POST TRAUMATIC STRESS DISORDER (PTSD): ICD-10-CM

## 2021-04-26 DIAGNOSIS — F41.1 GENERALIZED ANXIETY DISORDER: Primary | ICD-10-CM

## 2021-04-26 PROCEDURE — 90834 PSYTX W PT 45 MINUTES: CPT | Performed by: SOCIAL WORKER

## 2021-05-11 ENCOUNTER — TELEMEDICINE (OUTPATIENT)
Dept: BEHAVIORAL/MENTAL HEALTH CLINIC | Facility: CLINIC | Age: 36
End: 2021-05-11
Payer: COMMERCIAL

## 2021-05-11 DIAGNOSIS — F41.1 GENERALIZED ANXIETY DISORDER: Primary | ICD-10-CM

## 2021-05-11 DIAGNOSIS — F43.10 POST TRAUMATIC STRESS DISORDER (PTSD): ICD-10-CM

## 2021-05-11 PROCEDURE — 90834 PSYTX W PT 45 MINUTES: CPT | Performed by: SOCIAL WORKER

## 2021-05-11 NOTE — PSYCH
Virtual Regular Visit    This note was not shared with the patient due to this is a psychotherapy note    Assessment/Plan:    Problem List Items Addressed This Visit        Other    Generalized anxiety disorder - Primary    Post traumatic stress disorder (PTSD)          Goals addressed in session: Goal 1          Reason for visit is Behavioral Health session, conducted through video, due to COVID-19 precautions  Geneva Quezada has verbalized a preference to continue with virtual sessions at this time  Geneva Quezada has been offered in-person sessions and has declined  Encounter provider [facility]    Provider located at [address] [place] [id number] [phone]      Recent Visits  No visits were found meeting these conditions  Showing recent visits within past 7 days and meeting all other requirements     Future Appointments  No visits were found meeting these conditions  Showing future appointments within next 150 days and meeting all other requirements        The patient was identified by name and date of birth  Brissa Washington was informed that this is a telemedicine visit and that the visit is being conducted through [address] [place] [address] Now and patient was informed that this is a secure, HIPAA-compliant platform  She agrees to proceed     My office door was closed  No one else was in the room  She acknowledged consent and understanding of privacy and security of the video platform  The patient has agreed to participate and understands they can discontinue the visit at any time  Patient is aware this is a billable service  Subjective  Brissa Washington is a 37 y o  female  DATA: Met with Geneva Quezada for scheduled individual session  Topics of discussion included relationships with family and parenting concerns  Geenva Quezada was on her way to [place] for her daughter's monthly checkup at [address]   She was not driving at time time of the session  Her SO was present in the car  She acknowledged that she felt comfortable to continue her session with him present  Anita Reyes discussed her daughter's health issues and her continued slow weight gain  Anita Reyes states that she is hoping that the appointment today goes smoothly  She states that her SO's parents are planning to return to [place]  She states that she has some fear about them returning to this area, due to past conflicts  She states that her SO is excited about them moving back to the area  Overall, Anita Reyes states that she has been doing well  Client shows evidence of utilizing emotion regulation skills and effective communication skills skills to manage mental health symptoms  During this session, this clinician used the following therapeutic modalities: supportive psychotherapy, client-centered therapy, mindfulness-based strategies, DBT-informed skills, Motivational Interviewing and solution-focused therapy  ASSESSMENT: Anita Reyes presents with a euthymic mood  Her affect is normal range and intensity, appropriate  Anita Reyes exhibits strong therapeutic rapport with this clinician  Anita Reyes continues to exhibit willingness to work on treatment goals and objectives  Anita Reyes presents with a minimal risk of suicide, minimal risk of self-harm, and minimal risk of harm to others  PLAN: Anita Reyes will return in two weeks for the next scheduled session  Between sessions, Anita Reyes will continue to monitor her moods and use some mindfulness-based strategies to manage her anxiety and will report back during the next session re: successes and barriers  At the next session, this clinician will use supportive psychotherapy, client-centered therapy, mindfulness-based strategies, DBT-informed skills, Motivational Interviewing and solution-focused therapy to address her anxiety and relationship issues, in an effort to assist Anita Reyes with meeting treatment goals         HPI     Past Medical History:   Diagnosis Date    Asthma No past surgical history on file  Current Outpatient Medications   Medication Sig Dispense Refill    beclomethasone (QVAR REDIHALER) 40 MCG/ACT inhaler Inhale 2 puffs 2 (two) times a day      fluticasone (FLONASE) 50 mcg/act nasal spray 2 sprays into each nostril      loratadine (CLARITIN) 10 mg tablet Take 10 mg by mouth      montelukast (SINGULAIR) 10 mg tablet Take 10 mg by mouth       No current facility-administered medications for this visit  Allergies   Allergen Reactions    Aspirin Shortness Of Breath    Aspirin Buf(Ofstu-[id number]) Chest Pain, Cough and Shortness Of Breath    Naproxen Shortness Of Breath    Penicillins Hives, Itching, Shortness Of Breath and Wheezing    Latex Itching    Hydrocodone-Acetaminophen Rash       Review of Systems    Video Exam    There were no vitals filed for this visit  Physical Exam     I spent 45 minutes directly with the patient during this visit      VIRTUAL VISIT DISCLAIMER    Apolonia Parnell acknowledges that she has consented to an online visit or consultation  She understands that the online visit is based solely on information provided by her, and that, in the absence of a face-to-face physical evaluation by the physician, the diagnosis she receives is both limited and provisional in terms of accuracy and completeness  This is not intended to replace a full medical face-to-face evaluation by the physician  Apolonia Parnell understands and accepts these terms

## 2021-06-08 ENCOUNTER — TELEMEDICINE (OUTPATIENT)
Dept: BEHAVIORAL/MENTAL HEALTH CLINIC | Facility: CLINIC | Age: 36
End: 2021-06-08
Payer: COMMERCIAL

## 2021-06-08 DIAGNOSIS — F43.10 POST TRAUMATIC STRESS DISORDER (PTSD): ICD-10-CM

## 2021-06-08 DIAGNOSIS — F41.1 GENERALIZED ANXIETY DISORDER: Primary | ICD-10-CM

## 2021-06-08 PROCEDURE — 90834 PSYTX W PT 45 MINUTES: CPT | Performed by: SOCIAL WORKER

## 2021-06-08 NOTE — PSYCH
Virtual Regular Visit    This note was not shared with the patient due to this is a psychotherapy note      Assessment/Plan:    Problem List Items Addressed This Visit        Other    Generalized anxiety disorder - Primary    Post traumatic stress disorder (PTSD)          Goals addressed in session: Goal 1      Reason for visit is Behavioral Health session, conducted through video, due to COVID-19 precautions  Martha Gee has verbalized a preference to continue with virtual sessions at this time  Martha Gee has been offered in-person sessions and has declined  Encounter provider [facility]    Provider located at [address] [place] [address] Silverio Marlen Flores [facility] [id number] [phone]      Recent Visits  No visits were found meeting these conditions  Showing recent visits within past 7 days and meeting all other requirements     Future Appointments  No visits were found meeting these conditions  Showing future appointments within next 150 days and meeting all other requirements        The patient was identified by name and date of birth  Walter Contreras was informed that this is a telemedicine visit and that the visit is being conducted through [address] [place] [address] Now and patient was informed that this is a secure, HIPAA-compliant platform  She agrees to proceed     My office door was closed  No one else was in the room  She acknowledged consent and understanding of privacy and security of the video platform  The patient has agreed to participate and understands they can discontinue the visit at any time  Patient is aware this is a billable service  Subjective  Walter Contreras is a 37 y o  female  DATA: Met with Martha Gee for scheduled individual session  Topics of discussion included family stressors, marital stress and parenting concerns  Martha Gee discussed family-related issues-- e g , her SO's parents decision to move back to [place]   She also discussed putting her house on the market, and discussed the stress related to the listing process, in addition to finding a new home for them to live in  Gurwinder Rg states that she found a couple's therapist for she and Pipo Vázquez to see  They have an appointment scheduled for this Saturday  She states that she is hopeful that they will be able to work out their past and move forward with their relationship  She gave a brief update regarding the children  All is going well at this point  We also discussed Monse's plans for her education and career  She is weighing options for either completing her cosmetology course vs going to a technical program for a medical certificate  Client shows evidence of utilizing Mindfulness-based strategies and emotion regulation skills skills to manage mental health symptoms  During this session, this clinician used the following therapeutic modalities: supportive psychotherapy, client-centered therapy, mindfulness-based strategies, DBT-informed skills, Motivational Interviewing and solution-focused therapy  ASSESSMENT: Gurwinder Rg presents with a euthymic mood  Her affect is normal range and intensity, appropriate  Gurwinder Rg exhibits strong therapeutic rapport with this clinician  Gurwinder Rg continues to exhibit willingness to work on treatment goals and objectives  Gurwinder Rg presents with a minimal risk of suicide, minimal risk of self-harm, and minimal risk of harm to others  PLAN: Gurwinder Rg will return in two weeks for the next scheduled session  Between sessions, Gurwinder Rg will continue to monitor her moods  She and her partner will begin couples therapy and she will report back during the next session re: successes and barriers   At the next session, this clinician will use supportive psychotherapy, client-centered therapy, mindfulness-based strategies, DBT-informed skills, Motivational Interviewing and solution-focused therapy to address her mood regulation and relationship concerns, in an effort to assist Gurwinder Rg with meeting treatment goals  HPI     Past Medical History:   Diagnosis Date    Asthma        No past surgical history on file  Current Outpatient Medications   Medication Sig Dispense Refill    beclomethasone (QVAR REDIHALER) 40 MCG/ACT inhaler Inhale 2 puffs 2 (two) times a day      fluticasone (FLONASE) 50 mcg/act nasal spray 2 sprays into each nostril      loratadine (CLARITIN) 10 mg tablet Take 10 mg by mouth      montelukast (SINGULAIR) 10 mg tablet Take 10 mg by mouth       No current facility-administered medications for this visit  Allergies   Allergen Reactions    Aspirin Shortness Of Breath    Aspirin Buf([id number]) Chest Pain, Cough and Shortness Of Breath    Naproxen Shortness Of Breath    Penicillins Hives, Itching, Shortness Of Breath and Wheezing    Latex Itching    Hydrocodone-Acetaminophen Rash       Review of Systems    Video Exam    There were no vitals filed for this visit  Physical Exam     I spent 45 minutes directly with the patient during this visit      VIRTUAL VISIT DISCLAIMER    Luis Miller acknowledges that she has consented to an online visit or consultation  She understands that the online visit is based solely on information provided by her, and that, in the absence of a face-to-face physical evaluation by the physician, the diagnosis she receives is both limited and provisional in terms of accuracy and completeness  This is not intended to replace a full medical face-to-face evaluation by the physician  Luis Miller understands and accepts these terms

## 2021-06-21 ENCOUNTER — TELEMEDICINE (OUTPATIENT)
Dept: BEHAVIORAL/MENTAL HEALTH CLINIC | Facility: CLINIC | Age: 36
End: 2021-06-21
Payer: COMMERCIAL

## 2021-06-21 DIAGNOSIS — F43.10 POST TRAUMATIC STRESS DISORDER (PTSD): ICD-10-CM

## 2021-06-21 DIAGNOSIS — F41.1 GENERALIZED ANXIETY DISORDER: Primary | ICD-10-CM

## 2021-06-21 PROCEDURE — 90834 PSYTX W PT 45 MINUTES: CPT | Performed by: SOCIAL WORKER

## 2021-06-21 NOTE — PSYCH
Virtual Regular Visit    Assessment/Plan:    Problem List Items Addressed This Visit        Other    Generalized anxiety disorder - Primary    Post traumatic stress disorder (PTSD)        Goals addressed in session: Goal 1      Reason for visit is Behavioral Health session, conducted through video, due to COVID-19 precautions  Diana Juarez has verbalized a preference to continue with virtual sessions at this time  Diana Juarez has been offered in-person sessions and has declined  Encounter provider [facility] Marley    Provider located at [address] [place] [id number] [phone]      Recent Visits  No visits were found meeting these conditions  Showing recent visits within past 7 days and meeting all other requirements  Future Appointments  No visits were found meeting these conditions  Showing future appointments within next 150 days and meeting all other requirements       The patient was identified by name and date of birth  Suzan Rushing was informed that this is a telemedicine visit and that the visit is being conducted through [address] [place] [address] Now and patient was informed that this is a secure, HIPAA-compliant platform  She agrees to proceed     My office door was closed  No one else was in the room  She acknowledged consent and understanding of privacy and security of the video platform  The patient has agreed to participate and understands they can discontinue the visit at any time  Patient is aware this is a billable service  Subjective  Suzan Rushing is a 37 y o  female  DATA: Met with Diana Juarez for scheduled individual session  Topics of discussion included family stressors, relationships with family and parenting concerns  Diana Juarez discussed her relationship with her SO  They have started attending couple's therapy  She states that she continues to struggle with trust in their relationship   She is hoping that the couples sessions will help to solidify their relationship  Conchita Barclay discussed her fear of going to the grocery store and running other errands  We discussed exposure therapy and the use of the SUDS  She agreed to define her anxiety on the SUDS rating scale  This clinician encouraged her to practice putting herself in situations that increase her SUDS (e g , going to the grocery store)  Client shows evidence of utilizing Mindfulness-based strategies skills to manage mental health symptoms  During this session, this clinician used the following therapeutic modalities: supportive psychotherapy, client-centered therapy, mindfulness-based strategies, DBT-informed skills, Motivational Interviewing, solution-focused therapy and prolonged exposure  ASSESSMENT: Conchita Barclay presents with a euthymic mood  Her affect is normal range and intensity, appropriate  Conchita Barclay exhibits good therapeutic rapport with this clinician  Conchita Barclay continues to exhibit willingness to work on treatment goals and objectives  Conchita Barclay presents with a minimal risk of suicide, minimal risk of self-harm, and minimal risk of harm to others  PLAN: Conchita Barclay will return in two weeks for the next scheduled session  Between sessions, Conchita Barclay will define her anxiety on the SUDS scale  She will attempt to engage in exposure techniques and will report back during the next session re: successes and barriers  At the next session, this clinician will use supportive psychotherapy, client-centered therapy, mindfulness-based strategies, DBT-informed skills, Motivational Interviewing, solution-focused therapy and prolonged exposure to address her mood regulation, anxiety management, and relationship issues, in an effort to assist Conchita Barclay with meeting treatment goals  HPI     Past Medical History:   Diagnosis Date    Asthma        No past surgical history on file      Current Outpatient Medications   Medication Sig Dispense Refill    beclomethasone (QVAR REDIHALER) 40 MCG/ACT inhaler Inhale 2 puffs 2 (two) times a day      fluticasone (FLONASE) 50 mcg/act nasal spray 2 sprays into each nostril      loratadine (CLARITIN) 10 mg tablet Take 10 mg by mouth      montelukast (SINGULAIR) 10 mg tablet Take 10 mg by mouth       No current facility-administered medications for this visit  Allergies   Allergen Reactions    Aspirin Shortness Of Breath    Aspirin Buf([id number]) Chest Pain, Cough and Shortness Of Breath    Naproxen Shortness Of Breath    Penicillins Hives, Itching, Shortness Of Breath and Wheezing    Latex Itching    Hydrocodone-Acetaminophen Rash       Review of Systems    Video Exam    There were no vitals filed for this visit  Physical Exam     I spent 45 minutes directly with the patient during this visit      VIRTUAL VISIT DISCLAIMER    Phoebe Go acknowledges that she has consented to an online visit or consultation  She understands that the online visit is based solely on information provided by her, and that, in the absence of a face-to-face physical evaluation by the physician, the diagnosis she receives is both limited and provisional in terms of accuracy and completeness  This is not intended to replace a full medical face-to-face evaluation by the physician  Phoebe Go understands and accepts these terms

## 2021-07-07 ENCOUNTER — DOCUMENTATION (OUTPATIENT)
Dept: BEHAVIORAL/MENTAL HEALTH CLINIC | Facility: CLINIC | Age: 36
End: 2021-07-07

## 2021-07-07 ENCOUNTER — TELEMEDICINE (OUTPATIENT)
Dept: BEHAVIORAL/MENTAL HEALTH CLINIC | Facility: CLINIC | Age: 36
End: 2021-07-07
Payer: COMMERCIAL

## 2021-07-07 DIAGNOSIS — F41.1 GENERALIZED ANXIETY DISORDER: Primary | ICD-10-CM

## 2021-07-07 DIAGNOSIS — F43.10 POST TRAUMATIC STRESS DISORDER (PTSD): ICD-10-CM

## 2021-07-07 PROCEDURE — 90834 PSYTX W PT 45 MINUTES: CPT | Performed by: SOCIAL WORKER

## 2021-07-07 NOTE — PSYCH
Virtual Regular Visit    Assessment/Plan:    Problem List Items Addressed This Visit        Other    Generalized anxiety disorder - Primary    Post traumatic stress disorder (PTSD)        Goals addressed in session: Goal 1      Reason for visit is Behavioral Health session, conducted through video, due to COVID-19 precautions  Mario Francisco has verbalized a preference to continue with virtual sessions at this time  Mario Francisco has been offered in-person sessions and has declined  Encounter provider [facility]    Provider located at [address] [place] [id number] [phone]    Recent Visits  No visits were found meeting these conditions  Showing recent visits within past 7 days and meeting all other requirements  Future Appointments  No visits were found meeting these conditions  Showing future appointments within next 150 days and meeting all other requirements     The patient was identified by name and date of birth  Kirill Fishman was informed that this is a telemedicine visit and that the visit is being conducted through [address] [place] [address] Now and patient was informed that this is a secure, HIPAA-compliant platform  She agrees to proceed     My office door was closed  The patient was notified the following individuals were present in the room: This session was attended by [employer] employee orientation  Mario Francisco provided verbal consent for the new employee to sit in on this and future sessions  She acknowledges understanding that she can opt out of student observations/participation at any time     She acknowledged consent and understanding of privacy and security of the video platform  The patient has agreed to participate and understands they can discontinue the visit at any time  Patient is aware this is a billable service  Subjective  Kirill Fishman is a 37 y o  female      DATA: Met with Mario Francisco for scheduled individual session  Topics of discussion included family stressors, marital stress, relationships with family and parenting concerns  Zulay Pisano spent some time discussing the process of  her   She states that he is not contesting the divorce; however, he has obtained a  to try to get "marital assets " Her ex- has not yet gotten the testing completed for visitation with the children  Zulay Pisano has reached out to her  to request a modification of visitation  She discussed her frustration with the situation and with her ex's lack of follow through  Zulay Pisano states that her daughter continues to gain some weight  She continues to follow up with her daughter's medical team  Zulay Pisano discussed her busy summer  She also talked about her thoughts regarding returning to school  She states that she has significant anxiety regarding returning to school, due to not knowing anyone who is there  We agreed that she will get through the summer and discuss this issue after her children return to school in the fall  Client shows evidence of utilizing Mindfulness-based strategies and emotion regulation skills skills to manage mental health symptoms  During this session, this clinician used the following therapeutic modalities: supportive psychotherapy, client-centered therapy, mindfulness-based strategies, DBT-informed skills, Motivational Interviewing and solution-focused therapy  ASSESSMENT: Zulay Pisano presents with a euthymic mood  Her affect is normal range and intensity, appropriate  Zulay Pisano exhibits strong therapeutic rapport with this clinician  Zulay Pisano continues to exhibit willingness to work on treatment goals and objectives  Zulay Pisano presents with a minimal risk of suicide, minimal risk of self-harm, and minimal risk of harm to others  PLAN: Zulay Pisano will return in two weeks for the next scheduled session   Between sessions, Zulay Pisano will continue to use mindfulness-based strategies to manage her moods and will report back during the next session re: successes and barriers  At the next session, this clinician will use supportive psychotherapy, client-centered therapy, mindfulness-based strategies, DBT-informed skills, Motivational Interviewing and solution-focused therapy to address her mood regulation and relationship issues, in an effort to assist Vasquez Vaughn with meeting treatment goals  HPI     Past Medical History:   Diagnosis Date    Asthma        No past surgical history on file  Current Outpatient Medications   Medication Sig Dispense Refill    beclomethasone (QVAR REDIHALER) 40 MCG/ACT inhaler Inhale 2 puffs 2 (two) times a day      fluticasone (FLONASE) 50 mcg/act nasal spray 2 sprays into each nostril      loratadine (CLARITIN) 10 mg tablet Take 10 mg by mouth      montelukast (SINGULAIR) 10 mg tablet Take 10 mg by mouth       No current facility-administered medications for this visit  Allergies   Allergen Reactions    Aspirin Shortness Of Breath    Aspirin Buf(Erkmg-Rnixe-Gohsf) Chest Pain, Cough and Shortness Of Breath    Naproxen Shortness Of Breath    Penicillins Hives, Itching, Shortness Of Breath and Wheezing    Latex Itching    Hydrocodone-Acetaminophen Rash       Review of Systems    Video Exam    There were no vitals filed for this visit  Physical Exam     I spent 45 minutes directly with the patient during this visit      VIRTUAL VISIT DISCLAIMER    Rowan Cogan acknowledges that she has consented to an online visit or consultation  She understands that the online visit is based solely on information provided by her, and that, in the absence of a face-to-face physical evaluation by the physician, the diagnosis she receives is both limited and provisional in terms of accuracy and completeness  This is not intended to replace a full medical face-to-face evaluation by the physician  Rowan Cogan understands and accepts these terms

## 2021-07-20 ENCOUNTER — TELEMEDICINE (OUTPATIENT)
Dept: BEHAVIORAL/MENTAL HEALTH CLINIC | Facility: CLINIC | Age: 36
End: 2021-07-20
Payer: COMMERCIAL

## 2021-07-20 DIAGNOSIS — F41.1 GENERALIZED ANXIETY DISORDER: Primary | ICD-10-CM

## 2021-07-20 DIAGNOSIS — F43.10 POST TRAUMATIC STRESS DISORDER (PTSD): ICD-10-CM

## 2021-07-20 PROCEDURE — 90834 PSYTX W PT 45 MINUTES: CPT | Performed by: SOCIAL WORKER

## 2021-07-26 NOTE — PSYCH
Virtual Regular Visit    Verification of patient location:    Patient is located in the following state in which I hold an active license [place]      Assessment/Plan:    Problem List Items Addressed This Visit        Other    Generalized anxiety disorder - Primary    Post traumatic stress disorder (PTSD)          Goals addressed in session: Goal 1          Reason for visit is No chief complaint on file  Encounter provider [facility]    Provider located at [address] [place] [id number] [phone]      Recent Visits  No visits were found meeting these conditions  Showing recent visits within past 7 days and meeting all other requirements  Future Appointments  No visits were found meeting these conditions  Showing future appointments within next 150 days and meeting all other requirements       The patient was identified by name and date of birth  Royce Persaud was informed that this is a telemedicine visit and that the visit is being conducted through[employer] and patient was informed that this is a secure, HIPAA-compliant platform  She agrees to proceed     My office door was closed  No one else was in the room  She acknowledged consent and understanding of privacy and security of the video platform  The patient has agreed to participate and understands they can discontinue the visit at any time  Patient is aware this is a billable service  Subjective  Royce Persaud is a 31 y o  female  DATA: Met with Oscar Laird for scheduled individual session  Topics of discussion included marital stress and relationships with family  Oscar Laird discussed a recent panic attack she had while driving  She states that she was able to pull her car over and take some deep breaths to get through it  She described the physical sensations (which were congruent with symptoms of a panic attack)   We discussed seeking medical follow up if this happens again, as she has not experienced this type of event in the past  She acknowledged understanding and agreed to do so  Jerad Akhtar spoke about her relationship with her SO  She states that they are doing well with their couple's therapy  She states that she has reached a point where she is able to identify the importance of trust in the relationship  And she is able to acknowledge that trust is only real when she does not have to check his phone or read his emails/texts to know what he is doing  This clinician validated her responses and encouraged her to continue to set/maintain her personal limits with him  Client shows evidence of utilizing weighing pros and cons and emotion regulation skills skills to manage mental health symptoms  During this session, this clinician used the following therapeutic modalities: supportive psychotherapy, client-centered therapy, mindfulness-based strategies, DBT-informed skills, Motivational Interviewing and solution-focused therapy  ASSESSMENT: Jerad Akhtar presents with a euthymic mood  Her affect is normal range and intensity, appropriate  Jerad Akhtar exhibits good therapeutic rapport with this clinician  Jerad Akhtar continues to exhibit willingness to work on treatment goals and objectives  Jerad Akhtar presents with a minimal risk of suicide, minimal risk of self-harm, and minimal risk of harm to others  PLAN: Jerad Akhtar will return in two weeks for the next scheduled session  Between sessions, Jerad Akhtar will continue to set and maintain personal limits/boundaries with her partner and will report back during the next session re: successes and barriers  At the next session, this clinician will use supportive psychotherapy, client-centered therapy, mindfulness-based strategies, DBT-informed skills, Motivational Interviewing and solution-focused therapy to address her mood regulation and relationship issues, in an effort to assist Jerad Akhtar with meeting treatment goals         HPI     Past Medical History:   Diagnosis Date    Asthma        No past surgical history on file  Current Outpatient Medications   Medication Sig Dispense Refill    beclomethasone (QVAR REDIHALER) 40 MCG/ACT inhaler Inhale 2 puffs 2 (two) times a day      fluticasone (FLONASE) 50 mcg/act nasal spray 2 sprays into each nostril      loratadine (CLARITIN) 10 mg tablet Take 10 mg by mouth      montelukast (SINGULAIR) 10 mg tablet Take 10 mg by mouth       No current facility-administered medications for this visit  Allergies   Allergen Reactions    Aspirin Shortness Of Breath    Aspirin Buf(Wsymb-Ibcqj-Bviti) Chest Pain, Cough and Shortness Of Breath    Naproxen Shortness Of Breath    Penicillins Hives, Itching, Shortness Of Breath and Wheezing    Latex Itching    Hydrocodone-Acetaminophen Rash       Review of Systems    Video Exam    There were no vitals filed for this visit  Physical Exam     I spent 45 minutes directly with the patient during this visit    VIRTUAL VISIT DISCLAIMER    Denis Marie verbally agrees to participate in [employer]  Pt is aware that [employer] could be limited without vital signs or the ability to perform a full hands-on physical Dillon Lucero understands she or the provider may request at any time to terminate the video visit and request the patient to seek care or treatment in person

## 2021-08-17 ENCOUNTER — TELEMEDICINE (OUTPATIENT)
Dept: BEHAVIORAL/MENTAL HEALTH CLINIC | Facility: CLINIC | Age: 36
End: 2021-08-17
Payer: COMMERCIAL

## 2021-08-17 DIAGNOSIS — F43.10 POST TRAUMATIC STRESS DISORDER (PTSD): ICD-10-CM

## 2021-08-17 DIAGNOSIS — F41.1 GENERALIZED ANXIETY DISORDER: Primary | ICD-10-CM

## 2021-08-17 PROCEDURE — 90834 PSYTX W PT 45 MINUTES: CPT | Performed by: SOCIAL WORKER

## 2021-08-17 NOTE — PSYCH
Virtual Regular Visit    Verification of patient location:    Patient is located in the following state in which I hold an active license [place]    Assessment/Plan:    Problem List Items Addressed This Visit        Other    Generalized anxiety disorder - Primary    Post traumatic stress disorder (PTSD)        Goals addressed in session: Goal 1      Reason for visit is No chief complaint on file  Encounter provider [facility]    Provider located at [address] [place] [id number] [phone]    Recent Visits  No visits were found meeting these conditions  Showing recent visits within past 7 days and meeting all other requirements  Future Appointments  No visits were found meeting these conditions  Showing future appointments within next 150 days and meeting all other requirements    The patient was identified by name and date of birth  Frankie Staley was informed that this is a telemedicine visit and that the visit is being conducted through[employer]ivered and patient was informed that this is a secure, HIPAA-compliant platform  She agrees to proceed     My office door was closed  No one else was in the room  She acknowledged consent and understanding of privacy and security of the video platform  The patient has agreed to participate and understands they can discontinue the visit at any time  Patient is aware this is a billable service  Subjective  Frankie Staley is a 31 y o  female  DATA: Met with Arpan Villalobos for scheduled individual session  Topics of discussion included relationships with family, education-related stress and relationship with her SO  "I had to call for an emergency appointment with the couple's therapist " Arpan Villalobos spent significant time discussing her relationship with her SO  She states that she found out that he was corresponding with other women on a dating bari   She states that she has set some limits with regard to how long she plans to remain in the relationship  She states that she is feeling frustrated with his lack of regard for her feelings and for their relationship  Mario Francisco states that during her mother's visit, her mother went with her to [facility] regarding her education  Mario Francisco expressed her ambivalence regarding completing her education there  She states that she has significant anxiety about returning the school, since she does not know anyone who is in the program  She also states that 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  Her primary interest is in being an  and doing skin/makeup work  Client shows evidence of utilizing Mindfulness-based strategies, weighing pros and cons, emotion regulation skills and distress tolerance skills skills to manage mental health symptoms  During this session, this clinician used the following therapeutic modalities: supportive psychotherapy, client-centered therapy, mindfulness-based strategies, DBT-informed skills, Motivational Interviewing and solution-focused therapy  ASSESSMENT: Mario Francisco presents with a normal mood  Her affect is normal range and intensity, appropriate  Mario Francisco exhibits good therapeutic rapport with this clinician  Mario Francisco continues to exhibit willingness to work on treatment goals and objectives  Mario Francisco presents with a minimal risk of suicide, minimal risk of self-harm, and minimal risk of harm to others  PLAN: Mario Francisco will return in two weeks for the next scheduled session  Between sessions, aMrio Francisco will continue to monitor her moods, set limits with her SO, and explore options for continuing her education  She will report back during the next session re: successes and barriers   At the next session, this clinician will use supportive psychotherapy, client-centered therapy, mindfulness-based strategies, DBT-informed skills, Motivational Interviewing and solution-focused therapy to address her mood regulation and relationship issues, in an effort to assist Chichi with meeting treatment goals  HPI     Past Medical History:   Diagnosis Date    Asthma        No past surgical history on file  Current Outpatient Medications   Medication Sig Dispense Refill    beclomethasone (QVAR REDIHALER) 40 MCG/ACT inhaler Inhale 2 puffs 2 (two) times a day      fluticasone (FLONASE) 50 mcg/act nasal spray 2 sprays into each nostril      loratadine (CLARITIN) 10 mg tablet Take 10 mg by mouth      montelukast (SINGULAIR) 10 mg tablet Take 10 mg by mouth       No current facility-administered medications for this visit  Allergies   Allergen Reactions    Aspirin Shortness Of Breath    Aspirin Buf(Maxas-Vbrxi-Vfsia) Chest Pain, Cough and Shortness Of Breath    Naproxen Shortness Of Breath    Penicillins Hives, Itching, Shortness Of Breath and Wheezing    Latex Itching    Hydrocodone-Acetaminophen Rash       Review of Systems    Video Exam    There were no vitals filed for this visit  Physical Exam     I spent 45 minutes directly with the patient during this visit    VIRTUAL VISIT DISCLAIMER    Rowan Cogan verbally agrees to participate in [employer]  Pt is aware that [employer] could be limited without vital signs or the ability to perform a full hands-on physical Shelia Ruano understands she or the provider may request at any time to terminate the video visit and request the patient to seek care or treatment in person

## 2021-08-30 ENCOUNTER — TELEMEDICINE (OUTPATIENT)
Dept: BEHAVIORAL/MENTAL HEALTH CLINIC | Facility: CLINIC | Age: 36
End: 2021-08-30
Payer: COMMERCIAL

## 2021-08-30 DIAGNOSIS — F41.1 GENERALIZED ANXIETY DISORDER: Primary | ICD-10-CM

## 2021-08-30 DIAGNOSIS — F43.10 POST TRAUMATIC STRESS DISORDER (PTSD): ICD-10-CM

## 2021-08-30 PROCEDURE — 90834 PSYTX W PT 45 MINUTES: CPT | Performed by: SOCIAL WORKER

## 2021-08-30 NOTE — PSYCH
Psychotherapy Provided: Individual Psychotherapy 45 minutes     Length of time in session: 45 minutes, follow up in 2 weeks    Encounter Diagnosis     ICD-10-CM    1  Generalized anxiety disorder  F41 1    2  Post traumatic stress disorder (PTSD)  F43 10        Goals addressed in session: Goal 1     Pain:      none    Current suicide risk : Low     DATA: Met with Nam Brittle for scheduled individual session  Topics of discussion included family stressors, marital stress and education-related stress  "The kids are in school " Magdaleno Brittle states that her children are both in all-day school  Radha Umana has started  and Bandar Moran is currently in 5th grade (which is at the middle school)  Magdaleno Brittle is considering her own options for returning to school  She states that she has a meeting scheduled to meet with a new program, where she would be focused on learning aesthetics (skin care) rather than focusing on hair care and styling  She states that this course seems to be more in line with what she is interested in  She states that she also would be able to do some courses from home (virtually), which will help with her ability to attend and decrease her need for childcare  Magdaleno Brittle spent some time talking about her relationship  She states that "for the first time, he seems to have realized how his actions impacted me " She states that they had a discussion in their couple's session regarding her discovery of his online conversations with other women  She stated that she is no longer surprised to find these messages on his phone  Magdaleno Brittle states that her mood has been "stressed" the past couple weeks  She states that she finds it difficult to leave her home  She has a difficult time operationalizing exactly what the thoughts, feelings, and physical sensations are that increase her anxiety   She states that, when she has all the children, her thoughts include worry-thoughts about the children-- concerns that the older children will run off and she will have trouble finding them, concerns that her younger child will touch things that she's not supposed to (or are dangerous), concerns that someone will grab her  She continues to have worry-thoughts even when she alone  Worry about people around her, what they are going to do, worrying about "if something is going to happen " She states that she uses some positive self-talk both before and after the event; however, when she is in the midst of the event, she continuously has these worry thoughts and has difficulty worrying about what she is supposed to be doing  She states that she ends up forgetting to purchase things that she needs, despite having a list with her  We discussed the possibility of using some grounding techniques or mindfulness-based strategies to manage her anxiety while she is in the moment  Client shows evidence of utilizing emotion regulation skills and distress tolerance skills skills to manage mental health symptoms  During this session, this clinician used the following therapeutic modalities: supportive psychotherapy, client-centered therapy, mindfulness-based strategies, DBT-informed skills, Motivational Interviewing and solution-focused therapy  ASSESSMENT: Deisy Kellogg presents with a normal mood  Her affect is normal range and intensity, appropriate  Deisy Kellogg exhibits good therapeutic rapport with this clinician  Deisy Klelogg continues to exhibit willingness to work on treatment goals and objectives  Deisy Kellogg presents with a minimal risk of suicide, minimal risk of self-harm, and minimal risk of harm to others  PLAN: Deisy Kellogg will return in two weeks for the next scheduled session  Between sessions, Deisy Kellogg will continue to monitor her moods and will continue to explore options for returning to school  She will report back during the next session re: successes and barriers   At the next session, this clinician will use supportive psychotherapy, client-centered therapy, mindfulness-based strategies, DBT-informed skills, Motivational Interviewing and solution-focused therapy to address her mood regulation and relationship concerns, in an effort to assist Silverio Huynh with meeting treatment goals  Behavioral Health Treatment Plan [facility]: Diagnosis and Treatment Plan explained to Lissette Ba relates understanding diagnosis and is agreeable to Treatment Plan   Yes

## 2021-09-27 ENCOUNTER — TELEMEDICINE (OUTPATIENT)
Dept: BEHAVIORAL/MENTAL HEALTH CLINIC | Facility: CLINIC | Age: 36
End: 2021-09-27
Payer: COMMERCIAL

## 2021-09-27 DIAGNOSIS — F41.1 GENERALIZED ANXIETY DISORDER: Primary | ICD-10-CM

## 2021-09-27 DIAGNOSIS — F43.10 POST TRAUMATIC STRESS DISORDER (PTSD): ICD-10-CM

## 2021-09-27 PROCEDURE — 90834 PSYTX W PT 45 MINUTES: CPT | Performed by: SOCIAL WORKER

## 2021-09-27 NOTE — PSYCH
Virtual Regular Visit    Verification of patient location:    Patient is located in the following state in which I hold an active license [place]      Assessment/Plan:    Problem List Items Addressed This Visit        Other    Generalized anxiety disorder - Primary    Post traumatic stress disorder (PTSD)          Goals addressed in session: Goal 1      Reason for visit is No chief complaint on file  Encounter provider UTE Natarajan    Provider located at [address] [place] [id number] [phone]      Recent Visits  No visits were found meeting these conditions  Showing recent visits within past 7 days and meeting all other requirements  Future Appointments  No visits were found meeting these conditions  Showing future appointments within next 150 days and meeting all other requirements       The patient was identified by name and date of birth  Lisa Marte was informed that this is a telemedicine visit and that the visit is being conducted through[employer] and patient was informed that this is a secure, HIPAA-compliant platform  She agrees to proceed     My office door was closed  No one else was in the room  She acknowledged consent and understanding of privacy and security of the video platform  The patient has agreed to participate and understands they can discontinue the visit at any time  Patient is aware this is a billable service  Subjective  Lisa Marte is a 31 y o  female  DATA: Met with Claudeen Ebbs for scheduled individual session  Topics of discussion included family stressors, parenting concerns and relationship stress with SO, treatment planning/update  "I had to cancel my last appointment, because I had a couple's session on the same day " Claudeen Ebbs states that she and her family will be moving to [place]  She states that they are moving to a much larger house   She states that the goal is to move between mid-October and February  She states that they need a much larger home to accommodate Monse's three children  Deisy Kellogg states that they are going to be renting for a year or so, to rebuild their credit  Deisy Kellogg participated in completion of her treatment plan update (which was overdue as a result of her cancelling her previous appointment)  Deisy Kellogg states that she would like to work on decreasing her fears and anxiety  She states that she feels that her social anxiety and fears of being in groups of people has gotten worse since the beginning of the covid crisis  We will work on development of mindfulness-based strategies and will explore use of prolonged exposure to decrease her anxiety response  Client shows evidence of utilizing emotion regulation skills and distress tolerance skills skills to manage mental health symptoms  During this session, this clinician used the following therapeutic modalities: supportive psychotherapy, client-centered therapy, mindfulness-based strategies, DBT-informed skills, Motivational Interviewing and solution-focused therapy  ASSESSMENT: Deisy Kellogg presents with a normal mood  Her affect is normal range and intensity, appropriate  Deisy Kellogg exhibits good therapeutic rapport with this clinician  Deisy Kellogg continues to exhibit willingness to work on treatment goals and objectives  Deisy Kellogg presents with a minimal risk of suicide, minimal risk of self-harm, and minimal risk of harm to others  PLAN: Deisy Kellogg will return in two weeks for the next scheduled session  Between sessions, Deisy Kellogg will continue to attend couples therapy to work on her relationship with her SO  She will also continue to practice mindfulness-based strategies to manage her anxiety and will report back during the next session re: successes and barriers   At the next session, this clinician will use supportive psychotherapy, client-centered therapy, mindfulness-based strategies, DBT-informed skills, Motivational Interviewing and solution-focused therapy to address her mood regulation and relationship concerns, in an effort to assist Chichi with meeting treatment goals  HPI     Past Medical History:   Diagnosis Date    Asthma        No past surgical history on file  Current Outpatient Medications   Medication Sig Dispense Refill    beclomethasone (QVAR REDIHALER) 40 MCG/ACT inhaler Inhale 2 puffs 2 (two) times a day      fluticasone (FLONASE) 50 mcg/act nasal spray 2 sprays into each nostril      loratadine (CLARITIN) 10 mg tablet Take 10 mg by mouth      montelukast (SINGULAIR) 10 mg tablet Take 10 mg by mouth       No current facility-administered medications for this visit  Allergies   Allergen Reactions    Aspirin Shortness Of Breath    Aspirin Buf(Tiaqm-[id number]) Chest Pain, Cough and Shortness Of Breath    Naproxen Shortness Of Breath    Penicillins Hives, Itching, Shortness Of Breath and Wheezing    Latex Itching    Hydrocodone-Acetaminophen Rash       Review of Systems    Video Exam    There were no vitals filed for this visit  Physical Exam     I spent 45 minutes directly with the patient during this visit    VIRTUAL VISIT DISCLAIMER    Karli Stephenson verbally agrees to participate in [employer]  Pt is aware that [employer] could be limited without vital signs or the ability to perform a full hands-on physical Chayo Boucher understands she or the provider may request at any time to terminate the video visit and request the patient to seek care or treatment in person
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
## 2021-10-12 ENCOUNTER — TELEMEDICINE (OUTPATIENT)
Dept: BEHAVIORAL/MENTAL HEALTH CLINIC | Facility: CLINIC | Age: 36
End: 2021-10-12
Payer: COMMERCIAL

## 2021-10-12 DIAGNOSIS — F41.1 GENERALIZED ANXIETY DISORDER: Primary | ICD-10-CM

## 2021-10-12 DIAGNOSIS — F43.10 POST TRAUMATIC STRESS DISORDER (PTSD): ICD-10-CM

## 2021-10-12 PROCEDURE — 90834 PSYTX W PT 45 MINUTES: CPT | Performed by: SOCIAL WORKER

## 2021-10-26 ENCOUNTER — TELEMEDICINE (OUTPATIENT)
Dept: BEHAVIORAL/MENTAL HEALTH CLINIC | Facility: CLINIC | Age: 36
End: 2021-10-26
Payer: COMMERCIAL

## 2021-10-26 DIAGNOSIS — F41.1 GENERALIZED ANXIETY DISORDER: Primary | ICD-10-CM

## 2021-10-26 DIAGNOSIS — F43.10 POST TRAUMATIC STRESS DISORDER (PTSD): ICD-10-CM

## 2021-10-26 PROCEDURE — 90834 PSYTX W PT 45 MINUTES: CPT | Performed by: SOCIAL WORKER

## 2021-11-09 ENCOUNTER — TELEMEDICINE (OUTPATIENT)
Dept: BEHAVIORAL/MENTAL HEALTH CLINIC | Facility: CLINIC | Age: 36
End: 2021-11-09
Payer: COMMERCIAL

## 2021-11-09 DIAGNOSIS — F41.1 GENERALIZED ANXIETY DISORDER: Primary | ICD-10-CM

## 2021-11-09 DIAGNOSIS — F43.10 POST TRAUMATIC STRESS DISORDER (PTSD): ICD-10-CM

## 2021-11-09 PROCEDURE — 90834 PSYTX W PT 45 MINUTES: CPT | Performed by: SOCIAL WORKER

## 2021-11-24 ENCOUNTER — TELEMEDICINE (OUTPATIENT)
Dept: BEHAVIORAL/MENTAL HEALTH CLINIC | Facility: CLINIC | Age: 36
End: 2021-11-24
Payer: COMMERCIAL

## 2021-11-24 DIAGNOSIS — F41.1 GENERALIZED ANXIETY DISORDER: Primary | ICD-10-CM

## 2021-11-24 DIAGNOSIS — F43.10 POST TRAUMATIC STRESS DISORDER (PTSD): ICD-10-CM

## 2021-11-24 PROCEDURE — 90834 PSYTX W PT 45 MINUTES: CPT | Performed by: SOCIAL WORKER

## 2021-12-07 ENCOUNTER — TELEMEDICINE (OUTPATIENT)
Dept: BEHAVIORAL/MENTAL HEALTH CLINIC | Facility: CLINIC | Age: 36
End: 2021-12-07
Payer: COMMERCIAL

## 2021-12-07 DIAGNOSIS — F43.10 POST TRAUMATIC STRESS DISORDER (PTSD): ICD-10-CM

## 2021-12-07 DIAGNOSIS — F41.1 GENERALIZED ANXIETY DISORDER: Primary | ICD-10-CM

## 2021-12-07 PROCEDURE — 90834 PSYTX W PT 45 MINUTES: CPT | Performed by: SOCIAL WORKER

## 2021-12-21 ENCOUNTER — TELEMEDICINE (OUTPATIENT)
Dept: BEHAVIORAL/MENTAL HEALTH CLINIC | Facility: CLINIC | Age: 36
End: 2021-12-21
Payer: COMMERCIAL

## 2021-12-21 DIAGNOSIS — F43.10 POST TRAUMATIC STRESS DISORDER (PTSD): ICD-10-CM

## 2021-12-21 DIAGNOSIS — F41.1 GENERALIZED ANXIETY DISORDER: Primary | ICD-10-CM

## 2021-12-21 PROCEDURE — 90834 PSYTX W PT 45 MINUTES: CPT | Performed by: SOCIAL WORKER

## 2021-12-28 ENCOUNTER — TELEMEDICINE (OUTPATIENT)
Dept: BEHAVIORAL/MENTAL HEALTH CLINIC | Facility: CLINIC | Age: 36
End: 2021-12-28
Payer: COMMERCIAL

## 2021-12-28 DIAGNOSIS — F41.1 GENERALIZED ANXIETY DISORDER: Primary | ICD-10-CM

## 2021-12-28 DIAGNOSIS — F43.10 POST TRAUMATIC STRESS DISORDER (PTSD): ICD-10-CM

## 2021-12-28 PROCEDURE — 90834 PSYTX W PT 45 MINUTES: CPT | Performed by: SOCIAL WORKER

## 2022-01-04 ENCOUNTER — TELEMEDICINE (OUTPATIENT)
Dept: BEHAVIORAL/MENTAL HEALTH CLINIC | Facility: CLINIC | Age: 37
End: 2022-01-04

## 2022-01-04 DIAGNOSIS — F43.10 POST TRAUMATIC STRESS DISORDER (PTSD): ICD-10-CM

## 2022-01-04 DIAGNOSIS — F41.1 GENERALIZED ANXIETY DISORDER: Primary | ICD-10-CM
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
## 2022-01-18 ENCOUNTER — TELEMEDICINE (OUTPATIENT)
Dept: BEHAVIORAL/MENTAL HEALTH CLINIC | Facility: CLINIC | Age: 37
End: 2022-01-18
Payer: COMMERCIAL

## 2022-01-18 DIAGNOSIS — F41.1 GENERALIZED ANXIETY DISORDER: Primary | ICD-10-CM

## 2022-01-18 DIAGNOSIS — F43.10 POST TRAUMATIC STRESS DISORDER (PTSD): ICD-10-CM

## 2022-01-18 PROCEDURE — 90834 PSYTX W PT 45 MINUTES: CPT | Performed by: SOCIAL WORKER
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
## 2022-02-01 ENCOUNTER — TELEMEDICINE (OUTPATIENT)
Dept: BEHAVIORAL/MENTAL HEALTH CLINIC | Facility: CLINIC | Age: 37
End: 2022-02-01
Payer: COMMERCIAL

## 2022-02-01 DIAGNOSIS — F43.10 POST TRAUMATIC STRESS DISORDER (PTSD): ICD-10-CM

## 2022-02-01 DIAGNOSIS — F41.1 GENERALIZED ANXIETY DISORDER: Primary | ICD-10-CM

## 2022-02-01 PROCEDURE — 90834 PSYTX W PT 45 MINUTES: CPT | Performed by: SOCIAL WORKER

## 2022-02-01 NOTE — PSYCH
Virtual Regular Visit    Verification of patient location:    Patient is located in the following state in which I hold an active license [place]    Assessment/Plan:    Problem List Items Addressed This Visit        Other    Generalized anxiety disorder - Primary    Post traumatic stress disorder (PTSD)        Goals addressed in session: Goal 1      Reason for visit is No chief complaint on file  Encounter provider UTE Dobbins    Provider located at [address] [place] [id number] [phone]    Recent Visits  No visits were found meeting these conditions  Showing recent visits within past 7 days and meeting all other requirements  Future Appointments  No visits were found meeting these conditions  Showing future appointments within next 150 days and meeting all other requirements     The patient was identified by name and date of birth  Rosa Boothe was informed that this is a telemedicine visit and that the visit is being conducted through[employer] Embedded and patient was informed this is a secure, HIPAA-complaint platform  She agrees to proceed     My office door was closed  No one else was in the room  She acknowledged consent and understanding of privacy and security of the video platform  The patient has agreed to participate and understands they can discontinue the visit at any time  Patient is aware this is a billable service  Subjective  Rosa Boothe is a 31 y o  female  DATA: Met with Chichi for scheduled individual session  Topics of discussion included relationships with family, parenting concerns and relationship with SO (and his family)  Karrie Nissen "Next week, Louis Barthel will be having surgery on her ears and they are hoping to do biopsies of her intestines at the same time " Brenda's weight gain has leveled off   Chichi discussed the process and stress related to getting her daughter scheduled for surgery  She states that she has  for her older children, so she can focus on Brenda's care and not have to worry about the other children getting to school  She discussed her older daughter "making it into championships" for tiffani Mooney and her family just got a new puppy  She is considering getting the dog registered as an emotional support animal  Celia Mooney discussed her relationship with her SO and with his family  Celia Mooney reports that she and Marcus Yadav are getting along pretty well at this point in time  She states that she is maintaining her limits/boundaries with Riccardo's mother  She states that she has no interest in maintaining a relationship with her  In addition, she states that she does not want Riccardo's mother to have any contact with any of her children (including Brenda)  Celia Mooney and I discussed my new role within the agency and the potential for limited frequency of therapy sessions  She states that she wants to remain with this clinician; however she is concerned about the potential decrease in frequency of sessions  She will think this over and will discuss with this clinician at our next session  Client shows evidence of utilizing emotion regulation skills and distress tolerance skills skills to manage mental health symptoms  During this session, this clinician used the following therapeutic modalities: supportive psychotherapy, client-centered therapy, mindfulness-based strategies, DBT-informed skills, Motivational Interviewing and solution-focused therapy  ASSESSMENT: Celia Mooney presents with a normal mood  Her affect is normal range and intensity, appropriate  Celia Mooney exhibits good therapeutic rapport with this clinician  Celia Mooney continues to exhibit willingness to work on treatment goals and objectives  Celia Mooney presents with a minimal risk of suicide, minimal risk of self-harm, and minimal risk of harm to others  PLAN: Celia Mooney will return in two weeks for the next scheduled session   Between sessions, Anita Reyes will continue to work on building her communication and trust with her SO  She will continue to set/maintain healthy boundaries in other relationships  Anita Reyes will report back during the next session re: successes and barriers  At the next session, this clinician will use supportive psychotherapy, client-centered therapy, mindfulness-based strategies, DBT-informed skills, Motivational Interviewing and solution-focused therapy to address her mood regulation and relationship concerns, in an effort to assist Anita Reyes with meeting treatment goals  HPI     Past Medical History:   Diagnosis Date    Asthma        No past surgical history on file  Current Outpatient Medications   Medication Sig Dispense Refill    beclomethasone (QVAR REDIHALER) 40 MCG/ACT inhaler Inhale 2 puffs 2 (two) times a day      fluticasone (FLONASE) 50 mcg/act nasal spray 2 sprays into each nostril      loratadine (CLARITIN) 10 mg tablet Take 10 mg by mouth      montelukast (SINGULAIR) 10 mg tablet Take 10 mg by mouth       No current facility-administered medications for this visit  Allergies   Allergen Reactions    Aspirin Shortness Of Breath    Aspirin Buf(Cakqz-[id number]) Chest Pain, Cough and Shortness Of Breath    Naproxen Shortness Of Breath    Penicillins Hives, Itching, Shortness Of Breath and Wheezing    Latex Itching    Hydrocodone-Acetaminophen Rash       Review of Systems    Video Exam    There were no vitals filed for this visit  Physical Exam     I spent 45 minutes directly with the patient during this visit    VIRTUAL VISIT DISCLAIMER    Vipin Curran verbally agrees to participate in [employer]   Pt is aware that [employer] could be limited without vital signs or the ability to perform a full hands-on physical Lugenia Tanya understands she or the provider may request at any time to terminate the video visit and request the patient to seek care or treatment in person

## 2022-02-16 ENCOUNTER — TELEMEDICINE (OUTPATIENT)
Dept: BEHAVIORAL/MENTAL HEALTH CLINIC | Facility: CLINIC | Age: 37
End: 2022-02-16
Payer: COMMERCIAL

## 2022-02-16 DIAGNOSIS — F41.1 GENERALIZED ANXIETY DISORDER: Primary | ICD-10-CM

## 2022-02-16 DIAGNOSIS — F43.10 POST TRAUMATIC STRESS DISORDER (PTSD): ICD-10-CM

## 2022-02-16 PROCEDURE — 90834 PSYTX W PT 45 MINUTES: CPT | Performed by: SOCIAL WORKER

## 2022-02-16 NOTE — PSYCH
Virtual Regular Visit    Verification of patient location:    Patient is located in the following state in which I hold an active license [place]    Assessment/Plan:    Problem List Items Addressed This Visit        Other    Generalized anxiety disorder - Primary    Post traumatic stress disorder (PTSD)        Goals addressed in session: Goal 1      Reason for visit is No chief complaint on file  Encounter provider UTE Arce    Provider located at [address] [place] [id number]  [phone]    Recent Visits  No visits were found meeting these conditions  Showing recent visits within past 7 days and meeting all other requirements  Future Appointments  No visits were found meeting these conditions  Showing future appointments within next 150 days and meeting all other requirements     The patient was identified by name and date of birth  Kleber Arce was informed that this is a telemedicine visit and that the visit is being conducted throughEpic Embedded and patient was informed this is a secure, HIPAA-complaint platform  She agrees to proceed     My office door was closed  No one else was in the room  She acknowledged consent and understanding of privacy and security of the video platform  The patient has agreed to participate and understands they can discontinue the visit at any time  Patient is aware this is a billable service  Subjective  Kleber Arce is a 31 y o  female  DATA: Met with Tong Lopez for scheduled individual session  Topics of discussion included family stressors, relationships with family and parenting concerns  Tong Lopez states that her daughter's surgery went well last week  She states that she has her daughter involved in swimming lessons--which is being covered by her insurance plan as a form of physical therapy  Tong Lopez states that she and her SO are getting along well at this point  She states that she and her SO are attending couples counseling, and she is hopeful that they will be able to work through their issues  Tessie Escalona states that her SO is not currently speaking with his mother  She states that she feels that he is being supportive of her  Client shows evidence of utilizing emotion regulation skills and distress tolerance skills skills to manage mental health symptoms  During this session, this clinician used the following therapeutic modalities: supportive psychotherapy, client-centered therapy, mindfulness-based strategies, DBT-informed skills, Motivational Interviewing and solution-focused therapy  ASSESSMENT: Tessie Escalona presents with a normal mood  Her affect is normal range and intensity, appropriate  Tessie Escalona exhibits good therapeutic rapport with this clinician  Tessie Escalona continues to exhibit willingness to work on treatment goals and objectives  Tessie Escalona presents with a minimal risk of suicide, minimal risk of self-harm, and minimal risk of harm to others  PLAN: Tessie Escalona will return in two weeks for the next scheduled session  Between sessions, Tessie Escalona will continue to monitor her moods  She will attend couples therapy with her SO and continue to work on setting and maintaining appropriate limits/boundaries with others in her life  She will report back during the next session re: successes and barriers  At the next session, this clinician will use supportive psychotherapy, client-centered therapy, mindfulness-based strategies, DBT-informed skills, Motivational Interviewing and solution-focused therapy to address her mood regulation and relationship concerns, in an effort to assist Tessie Escalona with meeting treatment goals  HPI     Past Medical History:   Diagnosis Date    Asthma        No past surgical history on file      Current Outpatient Medications   Medication Sig Dispense Refill    beclomethasone (QVAR REDIHALER) 40 MCG/ACT inhaler Inhale 2 puffs 2 (two) times a day      fluticasone (FLONASE) 50 mcg/act nasal spray 2 sprays into each nostril      loratadine (CLARITIN) 10 mg tablet Take 10 mg by mouth      montelukast (SINGULAIR) 10 mg tablet Take 10 mg by mouth       No current facility-administered medications for this visit  Allergies   Allergen Reactions    Aspirin Shortness Of Breath    Aspirin Buf(Ohzwo-Rqaeg-Eeldw) Chest Pain, Cough and Shortness Of Breath    Naproxen Shortness Of Breath    Penicillins Hives, Itching, Shortness Of Breath and Wheezing    Latex Itching    Hydrocodone-Acetaminophen Rash       Review of Systems    Video Exam    There were no vitals filed for this visit  Physical Exam     I spent 45 minutes directly with the patient during this visit    VIRTUAL VISIT DISCLAIMER    Tata Kendrick verbally agrees to participate in [employer]  Pt is aware that [employer] could be limited without vital signs or the ability to perform a full hands-on physical Rosalina Caller understands she or the provider may request at any time to terminate the video visit and request the patient to seek care or treatment in person

## 2022-03-04 ENCOUNTER — TELEMEDICINE (OUTPATIENT)
Dept: BEHAVIORAL/MENTAL HEALTH CLINIC | Facility: CLINIC | Age: 37
End: 2022-03-04
Payer: COMMERCIAL

## 2022-03-04 DIAGNOSIS — F43.10 POST TRAUMATIC STRESS DISORDER (PTSD): ICD-10-CM

## 2022-03-04 DIAGNOSIS — F41.1 GENERALIZED ANXIETY DISORDER: Primary | ICD-10-CM

## 2022-03-04 PROCEDURE — 90834 PSYTX W PT 45 MINUTES: CPT | Performed by: SOCIAL WORKER

## 2022-03-04 NOTE — BH TREATMENT PLAN
Rosa Tl  1985         Date of Initial Treatment Plan: March 27, 2019   Date of Current Treatment Plan: March 4, 2022     Treatment Plan Number 8     Strengths/Personal Resources for Self Care: motivation to be a good mother; positive therapeutic relationship; stable housing; support from her mother     Diagnosis:   1  Generalized anxiety disorder      2  Post traumatic stress disorder (PTSD)            Area of Needs: anxiety and PTSD      Long Term Goal 1: "I want to feel like I'm an individual in my own body and not like I am just here to do everything for everybody else  I want to find out who I am again "     Target Date:                                             January 25, 2022  Treatment Plan Expiration Date:           March 26, 2022  Completion Date:                           To be determined         Short Term Objectives for Goal 1:               0  Jeffrey Person identify triggers and prompting events that increase symptoms of anxiety  Update 03/04/2022: Leonela Nava continues to exhibit the ability to identify some of the triggers/prompting events that increase her anxiety and mood dysregulation  During the past six months, her primary trigger has been her relationship with her SO  She continues to identify some level of ambivalence about this relationship; however, she has verbalized a strong commitment to work on maintaining the relationship  She also identifies parenting as an additional stressor in her life  We will continue to monitor stressors in Monse's life and will modify treatment interventions as needed              5  Jeffrey Person learn and exhibit understanding of a minimum of three distress tolerance skills to assist with symptom reduction    Update 03/04/2022: Leonela Nava states, "I don't feel like I'm going to have a panic attack until it hits " We discussed incorporating some body-work (recognizing possible prodromal symptoms) to learn more about how to predict and prevent panic attacks   She states that she continues to engage in activities that she knows might trigger panic attacks  This writer will continue to encourage Celia Mooney to participate in exposure activities to increase her comfort-level with going out into public situations  When she is experiencing anxiety/panic symptoms, she identifies using some grounding techniques and mindfulness techniques  She identified use of the 5-senses skill and focusing on counting/breathing, etc                3  Jesse Smith learn and exhibit understanding of effective communication skills (using a DBT-informed perspective)  Update 03/04/2022: Celia Mooney states, "I could do better  I hold things in until I can't hold it anymore, and then I snap " We will continue to address ways that she can more effectively express herself-- especially in her relationship with her SO     4  Monse will identify and maintain personal limits and boundaries in relationships with her significant other  Any boundary crossings will be discussed in therapy sessions  Update 03/04/2022: Celia Mooney continues to set limits with her SO  There are times when she struggles to maintain these limits/boundaries  She has agreed to set a date for their wedding; however, she has set some limits/conditions for her agreement  She states that he must engage in couple's counseling, refrain from any dishonesty, refrain from being unfaithful (emotionally or physically), and show her that he values their relationship above that of his relationship with his mother  This clinician will continue to use Socratic questioning and motivational interviewing to assist her with maintaining her limits/boundaries             7  Celia Mooney will identify and discuss emotions related to her decisions and boundaries within this relationship   She will process these emotions in sessions      Update 03/04/2022: Celia Mooney states that she and her SO have set a date for their wedding; however, she states that she needs to figure out what she needs from him, in order for her to feel comfortable with moving forward with the wedding  She states that she wants to work on developing a list of these steps and then she will process, in therapy sessions, how she and her partner are doing with making the positive movement within their relationship  Arnie Leonard and her SO are planning to return to couple's therapy  She states that she has let him take the lead on getting them back into therapy, as one of the steps toward improving their relationship               6  Arnie Leonard will identify the origins of her guilt and will process how her guilt leads to her remaining in unhealthy situations  Update 03/04/2022: Arnie Leonard states that she feels that she needs to work on processing some of the issues from her previous relationship  "I need to deal with some of the trauma from my previous relationship, because I feel that it's affecting my current relationship "             7  Monse will decrease her level of depression and anxiety-- we will strive for a "mild" level of both depression and anxiety--with use of the PHQ-9 and DYLON-7  Update 03/04/2022: Arnie Leonard completed the PHQ-9 (score of 11, stable from previous score) and the DYLON-7 (score of 14, down from 17) screenings  These screenings are attached to this service for comparison with past and future screenings  6  Arnie Leonard will identify and participate in a minimum of 2 activities that she enjoys-- which bring her chely and a sense of "herself " We will review her activities during our sessions and will discuss ways to balance her family life and her need for participation in independent activities                        GOAL 1: Modality: Individual 1-2x per month   Completion Date to be determined and The person(s) responsible for carrying out the plan is Alissa (client) Kameron Rodarte (therapy)      Clinician will use client-centered therapy, mindfulness-based strategies, DBT-informed skills and solution-focused therapy to address Monse's symptoms of anxiety and PTSD  Monse will practice skills between sessions and will report back, during subsequent sessions regarding successes and barriers       [address] [place]: Diagnosis and Treatment Plan explained to Monse, Monse relates understanding diagnosis and is agreeable to Treatment Plan          Client Comments : Please share your thoughts, feelings, need and/or experiences regarding your treatment plan: "I love the work that we're doing together  I feel hopeful that we can get to where I need to be  I hope that I can achieve these goals  "     Rosa Boothe (: 1985) actively participated in the review and update of her treatment plan  Due to this update being completed during a virtual session (through the use of the [employer]), Leonela Nava provided verbal consent for this plan  The verbal consent was obtained on 2022 @ 11:54 am  The treatment plan was later transcribed into the Epic EHR

## 2022-03-04 NOTE — PSYCH
Virtual Regular Visit    Verification of patient location:    Patient is located in the following state in which I hold an active license [place]    Assessment/Plan:    Problem List Items Addressed This Visit        Other    Generalized anxiety disorder - Primary    Post traumatic stress disorder (PTSD)        Goals addressed in session: Goal 1      Reason for visit is No chief complaint on file  Encounter provider UTE Mirza    Provider located at [address] [place] [id number] [phone]    Recent Visits  No visits were found meeting these conditions  Showing recent visits within past 7 days and meeting all other requirements  Future Appointments  No visits were found meeting these conditions  Showing future appointments within next 150 days and meeting all other requirements       The patient was identified by name and date of birth  [place] was informed that this is a telemedicine visit and that the visit is being conducted through[employer] Embedded and patient was informed this is a secure, HIPAA-complaint platform  She agrees to proceed     My office door was closed  No one else was in the room  She acknowledged consent and understanding of privacy and security of the video platform  The patient has agreed to participate and understands they can discontinue the visit at any time  Patient is aware this is a billable service  Subjective  Bairon Beach is a 31 y o  female  DATA: Met with Conchita Barclay for scheduled individual session  Topics of discussion included family stressors, marital stress and parenting concerns  Conchita Barclay discussed the physical health care for her daughter  She states that her daughter was found to be significantly anemic and has been prescribed high dose iron  Conchita Barclay discussed her daughter's participation in physical therapy/swim classes   Overall, she states that her daughter has been doing well  Leonela Nava states that she has recognized that she needs to find some activities that will help her regain her sense of her identity  She states that she is trying to go back to the gym and get involved in doing more independent activities that bring her chely  She states that she and her SO are getting along well  She states that they have set a date for their wedding; however, she states that she has several conditions that he must meet prior to them getting   She states that he is working on getting them scheduled for couple's therapy--which is one of the conditions that she said is non-negotiable  We spent some time during this session reviewing and updating Monse's treatment plan  She states that she felt very prepared for this treatment plan update and had given it significant thought prior to today's session  She states that she wants to work on finding out "who I am again " Client shows evidence of utilizing Mindfulness-based strategies, weighing pros and cons, emotion regulation skills and distress tolerance skills skills to manage mental health symptoms  During this session, this clinician used the following therapeutic modalities: supportive psychotherapy, client-centered therapy, mindfulness-based strategies, DBT-informed skills, Motivational Interviewing and solution-focused therapy  ASSESSMENT: Leonela Nava presents with a normal mood  Her affect is normal range and intensity, appropriate  Leonela Nava exhibits good therapeutic rapport with this clinician  Leonela Nava continues to exhibit willingness to work on treatment goals and objectives  Leonela Nava presents with a minimal risk of suicide, minimal risk of self-harm, and minimal risk of harm to others  PLAN: Leonela Nava will return in two weeks for the next scheduled session  Between sessions, Leonela Nava will continue to maintain limits/boundaries with her fiance   She will also increase her participation in independent activities and will report back during the next session re: successes and barriers  At the next session, this clinician will use supportive psychotherapy, client-centered therapy, mindfulness-based strategies, DBT-informed skills, Motivational Interviewing and solution-focused therapy to address her mood regulation and relationship concerns, in an effort to assist Vikram Smith with meeting treatment goals  HPI     Past Medical History:   Diagnosis Date    Asthma        No past surgical history on file  Current Outpatient Medications   Medication Sig Dispense Refill    beclomethasone (QVAR REDIHALER) 40 MCG/ACT inhaler Inhale 2 puffs 2 (two) times a day      fluticasone (FLONASE) 50 mcg/act nasal spray 2 sprays into each nostril      loratadine (CLARITIN) 10 mg tablet Take 10 mg by mouth      montelukast (SINGULAIR) 10 mg tablet Take 10 mg by mouth       No current facility-administered medications for this visit  Allergies   Allergen Reactions    Aspirin Shortness Of Breath    Aspirin Buf(Ygmdr-[id number]-Hojdg) Chest Pain, Cough and Shortness Of Breath    Naproxen Shortness Of Breath    Penicillins Hives, Itching, Shortness Of Breath and Wheezing    Latex Itching    Hydrocodone-Acetaminophen Rash       Review of Systems    Video Exam    There were no vitals filed for this visit  Physical Exam     I spent 45 minutes directly with the patient during this visit    VIRTUAL VISIT DISCLAIMER    Grayson Shah verbally agrees to participate in [employer]  Pt is aware that [employer] could be limited without vital signs or the ability to perform a full hands-on physical Edgar Guilherme understands she or the provider may request at any time to terminate the video visit and request the patient to seek care or treatment in person

## 2022-03-18 ENCOUNTER — TELEMEDICINE (OUTPATIENT)
Dept: BEHAVIORAL/MENTAL HEALTH CLINIC | Facility: CLINIC | Age: 37
End: 2022-03-18
Payer: COMMERCIAL

## 2022-03-18 DIAGNOSIS — F43.10 POST TRAUMATIC STRESS DISORDER (PTSD): ICD-10-CM

## 2022-03-18 DIAGNOSIS — F41.1 GENERALIZED ANXIETY DISORDER: Primary | ICD-10-CM

## 2022-03-18 PROCEDURE — 90834 PSYTX W PT 45 MINUTES: CPT | Performed by: SOCIAL WORKER

## 2022-03-18 NOTE — PSYCH
Virtual Regular Visit    Verification of patient location:    Patient is located in the following state in which I hold an active license [place]    Assessment/Plan:    Problem List Items Addressed This Visit        Other    Generalized anxiety disorder - Primary    Post traumatic stress disorder (PTSD)        Goals addressed in session: Goal 1      Reason for visit is   Chief Complaint   Patient presents with    Virtual Regular Visit     Encounter provider UTE Pederesn    Provider located at [address] [place] [id number] [phone]    Recent Visits  Date Type Provider Dept   03/18/22 [address], [address]   Showing recent visits within past 7 days and meeting all other requirements  Future Appointments  No visits were found meeting these conditions  Showing future appointments within next 150 days and meeting all other requirements    The patient was identified by name and date of birth  Magdalena Valencia was informed that this is a telemedicine visit and that the visit is being conducted throughEpic Embedded and patient was informed this is a secure, HIPAA-complaint platform  She agrees to proceed     My office door was closed  No one else was in the room  She acknowledged consent and understanding of privacy and security of the video platform  The patient has agreed to participate and understands they can discontinue the visit at any time  Patient is aware this is a billable service  Subjective  Magdalena Valencia is a 31 y o  female  DATA: Met with Sasha Eisenberg for scheduled individual session  Topics of discussion included family stressors, marital stress, relationships with family, physical health concerns and parenting concerns   "I had surgery on my wrist a week and a half ago, and I had dental surgery two days ago " Sasha Eisenberg states that she is recovering from the surgeries  She states that she has started to go back to the gym; however, she has had to take it very slow, due to her recent hand surgery  Tyra Wolff reports that she and her SO are getting along pretty well  They have not yet restarted couples therapy  She states that she has an upcoming follow up appointment with her daughter, regarding her daughter's overall health concerns  Tyra Wolff states that she feels that she is doing well at this time  We will follow up again in two weeks  Client shows evidence of utilizing Mindfulness-based strategies skills to manage mental health symptoms  During this session, this clinician used the following therapeutic modalities: supportive psychotherapy, client-centered therapy, mindfulness-based strategies, DBT-informed skills, Motivational Interviewing and solution-focused therapy  ASSESSMENT: Tyra Wolff presents with a normal mood  Her affect is normal range and intensity, appropriate  Tyra Wolff exhibits good therapeutic rapport with this clinician  Tyra Wolff continues to exhibit willingness to work on treatment goals and objectives  Tyra Wolff presents with a minimal risk of suicide, minimal risk of self-harm, and minimal risk of harm to others  PLAN: Tyra Wolff will return in two weeks for the next scheduled session  Between sessions, Tyra Wolff will continue to practice mindfulness-based strategies to manage her moods and will report back during the next session re: successes and barriers  At the next session, this clinician will use supportive psychotherapy, client-centered therapy, mindfulness-based strategies, DBT-informed skills, Motivational Interviewing and solution-focused therapy to address her mood regulation and relationship concerns, in an effort to assist Tyra Wolff with meeting treatment goals  HPI     Past Medical History:   Diagnosis Date    Asthma        No past surgical history on file      Current Outpatient Medications   Medication Sig Dispense Refill    beclomethasone (QVAR REDIHALER) 40 MCG/ACT inhaler Inhale 2 puffs 2 (two) times a day      fluticasone (FLONASE) 50 mcg/act nasal spray 2 sprays into each nostril      loratadine (CLARITIN) 10 mg tablet Take 10 mg by mouth      montelukast (SINGULAIR) 10 mg tablet Take 10 mg by mouth       No current facility-administered medications for this visit  Allergies   Allergen Reactions    Aspirin Shortness Of Breath    Aspirin Buf(Hubwn-Zyemr-Kfapi) Chest Pain, Cough and Shortness Of Breath    Naproxen Shortness Of Breath    Penicillins Hives, Itching, Shortness Of Breath and Wheezing    Latex Itching    Hydrocodone-Acetaminophen Rash       Review of Systems    Video Exam    There were no vitals filed for this visit  Physical Exam     I spent 45 minutes directly with the patient during this visit    VIRTUAL VISIT DISCLAIMER    [place] verbally agrees to participate in [facility]  Pt is aware that [facility] could be limited without vital signs or the ability to perform a full hands-on physical Claudetta Hals understands she or the provider may request at any time to terminate the video visit and request the patient to seek care or treatment in person

## 2022-03-18 NOTE — PSYCH
Treatment Plan Tracking      There is an error on the treatment plan dated March 4, 2022  The actual dates that the next treatment plan is due are between July 2, 2022 & August 31, 2022

## 2022-04-01 ENCOUNTER — TELEMEDICINE (OUTPATIENT)
Dept: BEHAVIORAL/MENTAL HEALTH CLINIC | Facility: CLINIC | Age: 37
End: 2022-04-01
Payer: COMMERCIAL

## 2022-04-01 DIAGNOSIS — F41.1 GENERALIZED ANXIETY DISORDER: Primary | ICD-10-CM

## 2022-04-01 DIAGNOSIS — F43.10 POST TRAUMATIC STRESS DISORDER (PTSD): ICD-10-CM

## 2022-04-01 PROCEDURE — 90834 PSYTX W PT 45 MINUTES: CPT | Performed by: SOCIAL WORKER

## 2022-04-01 NOTE — PSYCH
Virtual Regular Visit    Verification of patient location:    Patient is located in the following state in which I hold an active license [place]    Assessment/Plan:    Problem List Items Addressed This Visit        Other    Generalized anxiety disorder - Primary    Post traumatic stress disorder (PTSD)        Goals addressed in session: Goal 1      Reason for visit is No chief complaint on file  Encounter provider UTE Salas    Provider located at [address] [place] [id number] [phone]    Recent Visits  No visits were found meeting these conditions  Showing recent visits within past 7 days and meeting all other requirements  Future Appointments  No visits were found meeting these conditions  Showing future appointments within next 150 days and meeting all other requirements     The patient was identified by name and date of birth  Melany Jara was informed that this is a telemedicine visit and that the visit is being conducted throughEpic Embedded and patient was informed this is a secure, HIPAA-complaint platform  She agrees to proceed     My office door was closed  No one else was in the room  She acknowledged consent and understanding of privacy and security of the video platform  The patient has agreed to participate and understands they can discontinue the visit at any time  Patient is aware this is a billable service  Subjective  Melany Jara is a 31 y o  female  DATA: Met with Nata Medrano for scheduled individual session  Topics of discussion included relationships with family, physical health concerns and parenting concernsand relationship with SO  Nata Medrano discussed the care of her daughter  She talked about some upcoming appointments-- for follow up regarding her daughter's developmental concerns   Overall, Nata Medrano states that her daughter has been doing well and has significantly increased her speaking  She states that she and her SO are getting along well  She states that he is in therapy and that they are awaiting calls back from couple's therapists  Juvenal Lynne states that she has been going to the gym; however, she states that she is still working on increasing individual activities  She states that she used to get together with one of her friends; however, she has not done this recently  This clinician encouraged her to call her friend and set a date for the two of them to get together  Client shows evidence of utilizing emotion regulation skills and distress tolerance skills skills to manage mental health symptoms  During this session, this clinician used the following therapeutic modalities: supportive psychotherapy, client-centered therapy, mindfulness-based strategies, DBT-informed skills, Motivational Interviewing and solution-focused therapy  ASSESSMENT: Juvenal Lynne presents with a normal mood  Her affect is normal range and intensity, appropriate  Juvenal Lynne exhibits good therapeutic rapport with this clinician  Juvenal Lynne continues to exhibit willingness to work on treatment goals and objectives  Juvenal Lynne presents with a minimal risk of suicide, minimal risk of self-harm, and minimal risk of harm to others  PLAN: Juvenal Lynne will return in approximately 2 weeks for the next scheduled session  Between sessions, Juvenal Lynne will continue to maintain open communication with her SO  She will also call her friend and set up a date for the two of them to get together  She will report back during the next session re: successes and barriers  At the next session, this clinician will use supportive psychotherapy, client-centered therapy, mindfulness-based strategies, DBT-informed skills, Motivational Interviewing and solution-focused therapy to address her mood regulation and relationship concerns, in an effort to assist Juvenal Lynne with meeting treatment goals       HPI     Past Medical History:   Diagnosis Date    Asthma        No past surgical history on file  Current Outpatient Medications   Medication Sig Dispense Refill    beclomethasone (QVAR REDIHALER) 40 MCG/ACT inhaler Inhale 2 puffs 2 (two) times a day      fluticasone (FLONASE) 50 mcg/act nasal spray 2 sprays into each nostril      loratadine (CLARITIN) 10 mg tablet Take 10 mg by mouth      montelukast (SINGULAIR) 10 mg tablet Take 10 mg by mouth       No current facility-administered medications for this visit  Allergies   Allergen Reactions    Aspirin Shortness Of Breath    Aspirin Buf(Awhhs-Qoumg-Wuxau) Chest Pain, Cough and Shortness Of Breath    Naproxen Shortness Of Breath    Penicillins Hives, Itching, Shortness Of Breath and Wheezing    Latex Itching    Hydrocodone-Acetaminophen Rash       Review of Systems    Video Exam    There were no vitals filed for this visit  Physical Exam     I spent 45 minutes directly with the patient during this visit    VIRTUAL VISIT DISCLAIMER    Lorenzo Angry verbally agrees to participate in [employer]  Pt is aware that [employer] could be limited without vital signs or the ability to perform a full hands-on physical Rusty Sierra understands she or the provider may request at any time to terminate the video visit and request the patient to seek care or treatment in person

## 2022-04-22 ENCOUNTER — TELEMEDICINE (OUTPATIENT)
Dept: BEHAVIORAL/MENTAL HEALTH CLINIC | Facility: CLINIC | Age: 37
End: 2022-04-22
Payer: COMMERCIAL

## 2022-04-22 DIAGNOSIS — F41.1 GENERALIZED ANXIETY DISORDER: Primary | ICD-10-CM

## 2022-04-22 DIAGNOSIS — F43.10 POST TRAUMATIC STRESS DISORDER (PTSD): ICD-10-CM

## 2022-04-22 PROCEDURE — 90834 PSYTX W PT 45 MINUTES: CPT | Performed by: SOCIAL WORKER

## 2022-04-22 NOTE — PSYCH
Virtual Regular Visit    Verification of patient location:    Patient is located in the following state in which I hold an active license [place]    Assessment/Plan:    Problem List Items Addressed This Visit        Other    Generalized anxiety disorder - Primary    Post traumatic stress disorder (PTSD)        Goals addressed in session: Goal 1      Reason for visit is No chief complaint on file  Encounter provider UTE Carbajal    Provider located at [address] [place] [id number] [phone]    Recent Visits  No visits were found meeting these conditions  Showing recent visits within past 7 days and meeting all other requirements  Future Appointments  No visits were found meeting these conditions  Showing future appointments within next 150 days and meeting all other requirements     The patient was identified by name and date of birth  Elisabeth Barboza was informed that this is a telemedicine visit and that the visit is being conducted throughEpic Embedded and patient was informed this is a secure, HIPAA-complaint platform  She agrees to proceed     My office door was closed  No one else was in the room  She acknowledged consent and understanding of privacy and security of the video platform  The patient has agreed to participate and understands they can discontinue the visit at any time  Patient is aware this is a billable service  Subjective  Elisabeth Barboza is a 31 y o  female  DATA: Met with Yodit Dickson for scheduled individual session  Topics of discussion included marital stress, relationships with family and parenting concerns  Yodit Dickson spent time discussing her relationship with her ex- and her anger/frustration with regard to his visitation with their children  Yodit Dickson states that he has not yet completed the psychological testing that he was supposed to have completed more than two years ago   She states that she has difficulty with questioning herself regarding whether or not she should ease the restrictions for visitation or go back to court to ask for full custody  She states that she feels that, if she had better  the first time she went to court, she would be in a different situation than she is in now  she states that she does not want to keep her children from seeing their father' however, she does not want them to be in any danger of him acting inappropriately in front of the children  She states that she and her SO are getting along well  She states that she is working on taking better care of herself (e g , going to the gym and focusing on self-care)  Client shows evidence of utilizing emotion regulation skills, effective communication skills and distress tolerance skills skills to manage mental health symptoms  During this session, this clinician used the following therapeutic modalities: supportive psychotherapy, client-centered therapy, mindfulness-based strategies, DBT-informed skills, Motivational Interviewing and solution-focused therapy  ASSESSMENT: Karina Kidd presents with a normal mood  Her affect is normal range and intensity, appropriate  Karina Kidd exhibits good therapeutic rapport with this clinician  Karina Kidd continues to exhibit willingness to work on treatment goals and objectives  Karina Kidd presents with a minimal risk of suicide, minimal risk of self-harm, and minimal risk of harm to others  PLAN: Karina Kidd will return in approximately two weeks for the next scheduled session  Between sessions, Karina Kidd will continue to work on improving her self-care routine and will report back during the next session re: successes and barriers   At the next session, this clinician will use supportive psychotherapy, client-centered therapy, mindfulness-based strategies, DBT-informed skills, Motivational Interviewing and solution-focused therapy to address her mood regulation and relationship concerns, in an effort to assist Chichi with meeting treatment goals  HPI     Past Medical History:   Diagnosis Date    Asthma        No past surgical history on file  Current Outpatient Medications   Medication Sig Dispense Refill    beclomethasone (QVAR REDIHALER) 40 MCG/ACT inhaler Inhale 2 puffs 2 (two) times a day      fluticasone (FLONASE) 50 mcg/act nasal spray 2 sprays into each nostril      loratadine (CLARITIN) 10 mg tablet Take 10 mg by mouth      montelukast (SINGULAIR) 10 mg tablet Take 10 mg by mouth       No current facility-administered medications for this visit  Allergies   Allergen Reactions    Aspirin Shortness Of Breath    Aspirin Buf(Hkddv-Ixsds-Epgdv) Chest Pain, Cough and Shortness Of Breath    Naproxen Shortness Of Breath    Penicillins Hives, Itching, Shortness Of Breath and Wheezing    Latex Itching    Hydrocodone-Acetaminophen Rash       Review of Systems    Video Exam    There were no vitals filed for this visit  Physical Exam     I spent 45 minutes directly with the patient during this visit    VIRTUAL VISIT DISCLAIMER    Dixon Edwards verbally agrees to participate in [employer]  Pt is aware that [employer] could be limited without vital signs or the ability to perform a full hands-on physical Kizzy Cabello understands she or the provider may request at any time to terminate the video visit and request the patient to seek care or treatment in person

## 2022-05-06 ENCOUNTER — TELEMEDICINE (OUTPATIENT)
Dept: BEHAVIORAL/MENTAL HEALTH CLINIC | Facility: CLINIC | Age: 37
End: 2022-05-06
Payer: COMMERCIAL

## 2022-05-06 DIAGNOSIS — F43.10 POST TRAUMATIC STRESS DISORDER (PTSD): ICD-10-CM

## 2022-05-06 DIAGNOSIS — F41.1 GENERALIZED ANXIETY DISORDER: Primary | ICD-10-CM

## 2022-05-06 PROCEDURE — 90834 PSYTX W PT 45 MINUTES: CPT | Performed by: SOCIAL WORKER

## 2022-05-13 NOTE — PSYCH
Virtual Regular Visit    Verification of patient location:    Patient is located in the following state in which I hold an active license [place]      Assessment/Plan:    Problem List Items Addressed This Visit        Other    Generalized anxiety disorder - Primary    Post traumatic stress disorder (PTSD)          Goals addressed in session: Goal 1          Reason for visit is No chief complaint on file  Encounter provider [facility]TE Luo    Provider located at [address] [place] [id number] [phone]      Recent Visits  No visits were found meeting these conditions  Showing recent visits within past 7 days and meeting all other requirements  Future Appointments  No visits were found meeting these conditions  Showing future appointments within next 150 days and meeting all other requirements       The patient was identified by name and date of birth  Acie Comment was informed that this is a telemedicine visit and that the visit is being conducted throughEpic Embedded and patient was informed this is a secure, HIPAA-complaint platform  She agrees to proceed     My office door was closed  No one else was in the room  She acknowledged consent and understanding of privacy and security of the video platform  The patient has agreed to participate and understands they can discontinue the visit at any time  Patient is aware this is a billable service  Subjective  Acie Comment is a 31 y o  female  DATA: Met with Rosy Coleman for scheduled individual session  Topics of discussion included relationship with significant other, family stressors, physical health concerns and parenting concerns  Rosy Coleman discussed her recent covid infection  She states that her family (all of the children and her SO) are getting over having covid   She states that she is still feeling very tired, but she states that she had a fairly mild case  Miguel Angel Thapa discussed her concerns for her daughter, as the doctors are still uncertain as to the cause of her low weight and small stature  Miguel Angel Thapa states that she has some concerns regarding the covid vaccine and the covid virus  Miguel Angel Thapa states that she and her SO are currently getting along well  They have not yet gotten an appointment with a couple's therapist  Client shows evidence of utilizing emotion regulation skills and distress tolerance skills skills to manage mental health symptoms  During this session, this clinician used the following therapeutic modalities: supportive psychotherapy, client-centered therapy, mindfulness-based strategies, DBT-informed skills, Motivational Interviewing and solution-focused therapy  ASSESSMENT: Miguel Angel Thapa presents with a normal mood  Her affect is normal range and intensity, appropriate  Miguel Angel Thapa exhibits good therapeutic rapport with this clinician  Miguel Angel Thapa continues to exhibit willingness to work on treatment goals and objectives  Miguel Angel Thapa presents with a minimal risk of suicide, minimal risk of self-harm, and minimal risk of harm to others  PLAN: Miguel Angel Thapa will return in approximately one month for the next scheduled session  Between sessions, Miguel Angel Thapa will continue to work on use of effective communication skills and boundary-setting  She will report back during the next session re: successes and barriers  At the next session, this clinician will use supportive psychotherapy, client-centered therapy, mindfulness-based strategies, DBT-informed skills, Motivational Interviewing and solution-focused therapy to address her mood regulation and relationship issues, in an effort to assist Miguel Angel Thapa with meeting treatment goals  HPI     Past Medical History:   Diagnosis Date    Asthma        No past surgical history on file      Current Outpatient Medications   Medication Sig Dispense Refill    beclomethasone (QVAR REDIHALER) 40 MCG/ACT inhaler Inhale 2 puffs 2 (two) times a day      fluticasone (FLONASE) 50 mcg/act nasal spray 2 sprays into each nostril      loratadine (CLARITIN) 10 mg tablet Take 10 mg by mouth      montelukast (SINGULAIR) 10 mg tablet Take 10 mg by mouth       No current facility-administered medications for this visit  Allergies   Allergen Reactions    Aspirin Shortness Of Breath    Aspirin Buf(Gaaed-Scaub-Lhacy) Chest Pain, Cough and Shortness Of Breath    Naproxen Shortness Of Breath    Penicillins Hives, Itching, Shortness Of Breath and Wheezing    Latex Itching    Hydrocodone-Acetaminophen Rash       Review of Systems    Video Exam    There were no vitals filed for this visit  Physical Exam     I spent 45 minutes directly with the patient during this visit    VIRTUAL VISIT DISCLAIMER    Josef Lopez verbally agrees to participate in [employer]  Pt is aware that [employer] could be limited without vital signs or the ability to perform a full hands-on physical Terence Gallardo understands she or the provider may request at any time to terminate the video visit and request the patient to seek care or treatment in person

## 2022-06-10 ENCOUNTER — TELEMEDICINE (OUTPATIENT)
Dept: BEHAVIORAL/MENTAL HEALTH CLINIC | Facility: CLINIC | Age: 37
End: 2022-06-10
Payer: COMMERCIAL

## 2022-06-10 DIAGNOSIS — F43.10 POST TRAUMATIC STRESS DISORDER (PTSD): ICD-10-CM

## 2022-06-10 DIAGNOSIS — F41.1 GENERALIZED ANXIETY DISORDER: Primary | ICD-10-CM

## 2022-06-10 PROCEDURE — 90834 PSYTX W PT 45 MINUTES: CPT | Performed by: SOCIAL WORKER

## 2022-06-10 NOTE — PSYCH
Virtual Regular Visit    Verification of patient location:    Patient is located in the following state in which I hold an active license [place]    Assessment/Plan:    Problem List Items Addressed This Visit    None         Goals addressed in session: Goal 1          Reason for visit is No chief complaint on file  Encounter provider [facility]    Provider located at [address] [place] [id number] [phone]      Recent Visits  No visits were found meeting these conditions  Showing recent visits within past 7 days and meeting all other requirements  Future Appointments  No visits were found meeting these conditions  Showing future appointments within next 150 days and meeting all other requirements       The patient was identified by name and date of birth  Manisha Mak was informed that this is a telemedicine visit and that the visit is being conducted throughEpic Embedded and patient was informed this is a secure, HIPAA-complaint platform  She agrees to proceed     My office door was closed  No one else was in the room  She acknowledged consent and understanding of privacy and security of the video platform  The patient has agreed to participate and understands they can discontinue the visit at any time  Patient is aware this is a billable service  Subjective  Manisha Mak is a 31 y o  female  DATA: Met with Di Vizcaino for scheduled individual session  Topics of discussion included relationship with significant other, family stressors, parenting concerns and mood regulation and symptoms  "We're on the way to [facility] for an emergency appointment for Jodi Castañeda discussed her concerns regarding her daughter's health  Her daughter has been doing much better, overall; however, she has been ill and Di Vizcaino was able to get an emergency appointment  Her SO was driving at the time of the appointment  Chichi stated that she wanted to continue with her scheduled appointment, as she was feeling significant anxiety  She stated that she was comfortable with her SO being present for her session  Chichi discussed her own mental health symptoms of anxiety  She states that she was denied for SSI, due to not having the correct records; however, she states that the records that SSI was looking for were for dates prior to the onset of her disabling condition  She indicates that she does have an  who is working with her to mediate these issues  Chichi reports that she recently started lexapro for treatment of anxiety/depression  Chichi states that she and her SO have been getting along well  She has been working in increasing her physical fitness routine and improving her overall nutrition  Chichi states that she and her SO are considering attending a group that appears to be focused on overall self-care and exploration  Client shows evidence of utilizing some mindfulness skills to manage mental health symptoms  During this session, this clinician used the following therapeutic modalities: supportive psychotherapy, client-centered therapy, mindfulness-based strategies, DBT-informed skills, Motivational Interviewing and solution-focused therapy  ASSESSMENT: Chichi presents with a normal mood  Her affect is normal range and intensity, appropriate  Chichi exhibits good therapeutic rapport with this clinician  Chichi continues to exhibit willingness to work on treatment goals and objectives  Chichi presents with a minimal risk of suicide, minimal risk of self-harm, and minimal risk of harm to others  PLAN: Chichi will return in approximately 2-3 weeks for the next scheduled session  Between sessions, Chichi will continue to monitor her mood, set limits/boundaries with her SO, and improve her self-care routine  She will report back during the next session re: successes and barriers   At the next session, this clinician will use supportive psychotherapy, client-centered therapy, mindfulness-based strategies, DBT-informed skills, Motivational Interviewing and solution-focused therapy to address her mood regulation and relationship concerns, in an effort to assist Mario Dobbins with meeting treatment goals  HPI     Past Medical History:   Diagnosis Date    Asthma        No past surgical history on file  Current Outpatient Medications   Medication Sig Dispense Refill    beclomethasone (QVAR REDIHALER) 40 MCG/ACT inhaler Inhale 2 puffs 2 (two) times a day      fluticasone (FLONASE) 50 mcg/act nasal spray 2 sprays into each nostril      loratadine (CLARITIN) 10 mg tablet Take 10 mg by mouth      montelukast (SINGULAIR) 10 mg tablet Take 10 mg by mouth       No current facility-administered medications for this visit  Allergies   Allergen Reactions    Aspirin Shortness Of Breath    Aspirin Buf(Kzwjg-[id number]-[id number]) Chest Pain, Cough and Shortness Of Breath    Naproxen Shortness Of Breath    Penicillins Hives, Itching, Shortness Of Breath and Wheezing    Latex Itching    Hydrocodone-Acetaminophen Rash       Review of Systems    Video Exam    There were no vitals filed for this visit  Physical Exam     I spent 45 minutes directly with the patient during this visit     Session start time: 11:09am  Session end time: 11:56am    VIRTUAL VISIT DISCLAIMER    Sydney He verbally agrees to participate in [employer]  Pt is aware that [employer] could be limited without vital signs or the ability to perform a full hands-on physical Villalta Yossi understands she or the provider may request at any time to terminate the video visit and request the patient to seek care or treatment in person

## 2022-06-24 ENCOUNTER — TELEMEDICINE (OUTPATIENT)
Dept: BEHAVIORAL/MENTAL HEALTH CLINIC | Facility: CLINIC | Age: 37
End: 2022-06-24
Payer: COMMERCIAL

## 2022-06-24 DIAGNOSIS — F41.1 GENERALIZED ANXIETY DISORDER: Primary | ICD-10-CM

## 2022-06-24 DIAGNOSIS — F43.10 POST TRAUMATIC STRESS DISORDER (PTSD): ICD-10-CM

## 2022-06-24 PROCEDURE — 90834 PSYTX W PT 45 MINUTES: CPT | Performed by: SOCIAL WORKER

## 2022-06-24 NOTE — PSYCH
Virtual Regular Visit    Verification of patient location:    Patient is located in the following state in which I hold an active license [place]    Assessment/Plan:    Problem List Items Addressed This Visit        Other    Generalized anxiety disorder - Primary    Post traumatic stress disorder (PTSD)        Goals addressed in session: Goal 1      Reason for visit is No chief complaint on file  Encounter provider [facility]    Provider located at [address] [place] [id number] [phone]    Recent Visits  No visits were found meeting these conditions  Showing recent visits within past 7 days and meeting all other requirements  Future Appointments  No visits were found meeting these conditions  Showing future appointments within next 150 days and meeting all other requirements     The patient was identified by name and date of birth  Kleber Arce was informed that this is a telemedicine visit and that the visit is being conducted throughEpic Embedded and patient was informed this is a secure, HIPAA-complaint platform  She agrees to proceed     My office door was closed  No one else was in the room  She acknowledged consent and understanding of privacy and security of the video platform  The patient has agreed to participate and understands they can discontinue the visit at any time  Patient is aware this is a billable service  Subjective  Kleber Arce is a 31 y o  female  DATA: Met with Tong Lopez for scheduled individual session  Topics of discussion included relationship with significant other, relationships with family, parenting concerns, financial issues, housing issues and mood regulation and symptoms  "There is a preliminary hearing today at 2p in [place] " Tong Lopez states that she will be attending the hearing  Her mother is visiting her, to provide assistance with the children   She does not know how long her mother will be able to stay with her  Scott Willard discussed her level of anxiety re: her SO's current incarceration and the uncertainty of his release  She states that she is unsure how long her mother will be able to stay; however, she does acknowledge that the help has alleviated a great deal of her stress  Scott Willard identified that she is using some mindfulness-based strategies to manage her anxiety  She will follow up with me, as needed, between sessions  Client shows evidence of utilizing distress tolerance skills skills to manage mental health symptoms  During this session, this clinician used the following therapeutic modalities: supportive psychotherapy, client-centered therapy, mindfulness-based strategies, DBT-informed skills, Motivational Interviewing and solution-focused therapy  ASSESSMENT: Scott Willard presents with a normal mood  Her affect is normal range and intensity, appropriate  Scott Willard exhibits good therapeutic rapport with this clinician  Scott Willard continues to exhibit willingness to work on treatment goals and objectives  Scott Willard presents with a minimal risk of suicide, minimal risk of self-harm, and minimal risk of harm to others  PLAN: Scott Willard will return in approximately 3 weeks for the next scheduled session  Between sessions, Scott Willard will practice mindfulness-based strategies to manage her moods  She will reach out to her natural supports (and, if needed, to this clinician) for support  She will report back during the next session re: successes and barriers  At the next session, this clinician will use supportive psychotherapy, client-centered therapy, mindfulness-based strategies, DBT-informed skills, Motivational Interviewing and solution-focused therapy to address her mood regulation and relationship concerns, in an effort to assist Scott Willard with meeting treatment goals  HPI     Past Medical History:   Diagnosis Date    Asthma        No past surgical history on file      Current Outpatient Medications   Medication Sig Dispense Refill    beclomethasone (QVAR REDIHALER) 40 MCG/ACT inhaler Inhale 2 puffs 2 (two) times a day      fluticasone (FLONASE) 50 mcg/act nasal spray 2 sprays into each nostril      loratadine (CLARITIN) 10 mg tablet Take 10 mg by mouth      montelukast (SINGULAIR) 10 mg tablet Take 10 mg by mouth       No current facility-administered medications for this visit  Allergies   Allergen Reactions    Aspirin Shortness Of Breath    Aspirin Buf(Hxequ-[id number]) Chest Pain, Cough and Shortness Of Breath    Naproxen Shortness Of Breath    Penicillins Hives, Itching, Shortness Of Breath and Wheezing    Latex Itching    Hydrocodone-Acetaminophen Rash       Review of Systems    Video Exam    There were no vitals filed for this visit  Physical Exam     I spent 47 minutes directly with the patient during this visit     Session start time: 11:07am   Session end time: 11:54am    VIRTUAL VISIT DISCLAIMER    Grayson Shah verbally agrees to participate in [employer]  Pt is aware that [employer] could be limited without vital signs or the ability to perform a full hands-on physical Edgar Maamees understands she or the provider may request at any time to terminate the video visit and request the patient to seek care or treatment in person

## 2022-07-15 ENCOUNTER — TELEMEDICINE (OUTPATIENT)
Dept: BEHAVIORAL/MENTAL HEALTH CLINIC | Facility: CLINIC | Age: 37
End: 2022-07-15
Payer: COMMERCIAL

## 2022-07-15 DIAGNOSIS — F41.1 GENERALIZED ANXIETY DISORDER: Primary | ICD-10-CM

## 2022-07-15 DIAGNOSIS — F43.10 POST TRAUMATIC STRESS DISORDER (PTSD): ICD-10-CM

## 2022-07-15 PROCEDURE — 90834 PSYTX W PT 45 MINUTES: CPT | Performed by: SOCIAL WORKER

## 2022-07-15 NOTE — PSYCH
Virtual Regular Visit    Verification of patient location:    Patient is located in the following state in which I hold an active license [place]    Assessment/Plan:    Problem List Items Addressed This Visit        Other    Generalized anxiety disorder - Primary    Post traumatic stress disorder (PTSD)        Goals addressed in session: Goal 1      Reason for visit is No chief complaint on file  Encounter provider [facility]    Provider located at [address] [place] [id number] [phone]    Recent Visits  No visits were found meeting these conditions  Showing recent visits within past 7 days and meeting all other requirements  Future Appointments  No visits were found meeting these conditions  Showing future appointments within next 150 days and meeting all other requirements     The patient was identified by name and date of birth  Neli Dorman was informed that this is a telemedicine visit and that the visit is being conducted throughEpic Embedded and patient was informed this is a secure, HIPAA-complaint platform  She agrees to proceed     My office door was closed  No one else was in the room  She acknowledged consent and understanding of privacy and security of the video platform  The patient has agreed to participate and understands they can discontinue the visit at any time  Patient is aware this is a billable service  Subjective  Neli Dorman is a 31 y o  female  DATA: Met with Mary Talbert for scheduled individual session  Topics of discussion included relationship with significant other, relationships with family, parenting concerns, financial issues, mood regulation and symptoms and issues related to her SO being incarcerated  "There is a preliminary hearing next Monday " Mary Talbert discussed her SO's legal situation   She states that her mother has returned home, and she does not currently have any assistance with her children  She expressed her frustration with her SO's current legal situation and the entire structure of the system  She states, "There are so many people who have done far worse and been punished far less " Celia Mooney states that she has been providing emotional support to her SO, and he has asked her to be his Durable POA upon his release from skilled nursing  She states that her house has sold, but the closing will not be happening until August 1st  She states that she "is faking it until all of this is over, and then I will need a really long nap " She states that she has an easier time dealing with the stress and anxiety during the day  She states that nighttime and when the children are extra needy that it is very difficult for her to maintain emotion regulation  She states, "It's really busy right now, so it's easier " Celia Mooney states that her landlord has been very understanding about her inability to pay her rent  Gerardgriffin Mooney has worked this out with her, and she will accept the rent after Celia Mooney closes on her house  We discussed Brenda's health concerns  She states that Angelito July continues to have feedings every four hours and has an NG tube  She states that the medical professionals are still unable to determine a cause for her weight loss  She states that there have been no significant updates regarding the status of the custody case with her ex-  Client shows evidence of utilizing distress tolerance skills skills to manage mental health symptoms  During this session, this clinician used the following therapeutic modalities: supportive psychotherapy, client-centered therapy, mindfulness-based strategies, DBT-informed skills, Motivational Interviewing and solution-focused therapy  ASSESSMENT: Celia Mooney presents with a normal mood  Her affect is normal range and intensity, appropriate  Celia Monoey exhibits good therapeutic rapport with this clinician   Celia Mooney continues to exhibit willingness to work on treatment goals and objectives  Anna Peters presents with a minimal risk of suicide, minimal risk of self-harm, and minimal risk of harm to others  PLAN: Anna Peters will return in 3 weeks for the next scheduled session  Between sessions, Anna Peters will continue to monitor her moods and use distraction techniques to manage her emotion regulation  She will report back during the next session re: successes and barriers  At the next session, this clinician will use supportive psychotherapy, client-centered therapy, mindfulness-based strategies, DBT-informed skills, Motivational Interviewing and solution-focused therapy to address her mood regulation and relationship concerns, in an effort to assist Anna Peters with meeting treatment goals  HPI     Past Medical History:   Diagnosis Date    Asthma        No past surgical history on file  Current Outpatient Medications   Medication Sig Dispense Refill    beclomethasone (QVAR REDIHALER) 40 MCG/ACT inhaler Inhale 2 puffs 2 (two) times a day      fluticasone (FLONASE) 50 mcg/act nasal spray 2 sprays into each nostril      loratadine (CLARITIN) 10 mg tablet Take 10 mg by mouth      montelukast (SINGULAIR) 10 mg tablet Take 10 mg by mouth       No current facility-administered medications for this visit  Allergies   Allergen Reactions    Aspirin Shortness Of Breath    Aspirin Buf(Snjmu-Avbgy-Yzjrk) Chest Pain, Cough and Shortness Of Breath    Naproxen Shortness Of Breath    Penicillins Hives, Itching, Shortness Of Breath and Wheezing    Latex Itching    Hydrocodone-Acetaminophen Rash       Review of Systems    Video Exam    There were no vitals filed for this visit  Physical Exam     I spent 47 minutes directly with the patient during this visit     Session start time: 2:06pm  Session end time: 2:53pm     VIRTUAL VISIT DISCLAIMER    Milta Apgar verbally agrees to participate in [employer]   Pt is aware that [facility] could be limited without vital signs or the ability to perform a full hands-on physical Williamreza Anderson understands she or the provider may request at any time to terminate the video visit and request the patient to seek care or treatment in person

## 2022-08-05 ENCOUNTER — TELEMEDICINE (OUTPATIENT)
Dept: BEHAVIORAL/MENTAL HEALTH CLINIC | Facility: CLINIC | Age: 37
End: 2022-08-05
Payer: COMMERCIAL

## 2022-08-05 DIAGNOSIS — F43.10 POST TRAUMATIC STRESS DISORDER (PTSD): ICD-10-CM

## 2022-08-05 DIAGNOSIS — F41.1 GENERALIZED ANXIETY DISORDER: Primary | ICD-10-CM

## 2022-08-05 PROCEDURE — 90834 PSYTX W PT 45 MINUTES: CPT | Performed by: SOCIAL WORKER

## 2022-08-05 NOTE — PSYCH
Virtual Regular Visit    Verification of patient location:    Patient is located in the following state in which I hold an active license [place]    Assessment/Plan:    Problem List Items Addressed This Visit        Other    Generalized anxiety disorder - Primary    Post traumatic stress disorder (PTSD)        Goals addressed in session: Goal 1      Reason for visit is No chief complaint on file  Encounter provider [facility]    Provider located at [address] [place] [id number]  [phone]    Recent Visits  No visits were found meeting these conditions  Showing recent visits within past 7 days and meeting all other requirements  Future Appointments  No visits were found meeting these conditions  Showing future appointments within next 150 days and meeting all other requirements     The patient was identified by name and date of birth  Sukhwinder Mitchell was informed that this is a telemedicine visit and that the visit is being conducted throughEpic Embedded and patient was informed this is a secure, HIPAA-complaint platform  She agrees to proceed     My office door was closed  No one else was in the room  She acknowledged consent and understanding of privacy and security of the video platform  The patient has agreed to participate and understands they can discontinue the visit at any time  Patient is aware this is a billable service  Subjective  Sukhwinder Mitchell is a 30 y o  female  DATA: Met with Rayna Weber for scheduled individual session  Topics of discussion included relationship with significant other, relationships with family, work-related stress, parenting concerns, financial issues and mood regulation and symptoms  "I'm still waiting to know what's going on with Baljeet Emeryrubia " Rayna Weber states that the [facility] case was dropped, and he is being extradited to [employer] for an old court fine   He has been sentenced to [address] for driving on a suspended license, and he will be able to go to work (as long as Nisha Nath is driving)  Once he has completed all of his [place] cases, then he might be extradited to [place] for a [facility] warrant  We discussed how she is managing her household on her own  She states that she has told Eloise Sharma that she is "stronger than I thought I was, and I can handle all of this on my own " She states that she has told him that she will not "settle for less" than being treated well in her relationship  She has set a limit with him regarding attempts to reach out to other women for inappropriate communication  Nisha Nath states that she will be taking her daughter Birda Klinefelter) to [facility] to see how much longer she will need to have her feeding tube  She states that she hopes that Eloise Sharma will be home; however, she states that she can do this on her own, if needed  Nisha Nath discussed her new hobby craft business of creating [employer]  She states that she is very much enjoying this activity  Nisha Nath discussed other family relationships  She states that her mother is planning to move back to [place] in the relatively near future  She has no news related to her ex-'s visitation and child support  Client shows evidence of utilizing emotion regulation skills and distress tolerance skills skills to manage mental health symptoms  During this session, this clinician used the following therapeutic modalities: supportive psychotherapy, client-centered therapy, mindfulness-based strategies, DBT-informed skills, Motivational Interviewing and solution-focused therapy  ASSESSMENT: Nisha Nath presents with a normal mood  Her affect is normal range and intensity, appropriate  Nisha Nath exhibits good therapeutic rapport with this clinician  Nisha Nath continues to exhibit willingness to work on treatment goals and objectives   Nisha Nath presents with a minimal risk of suicide, minimal risk of self-harm, and minimal risk of harm to others  PLAN: Leandro Miles will return in approximately 3 weeks for the next scheduled session  Between sessions, Leandro Miles will continue to monitor her moods and utilize her existing coping strategies to manage stress and anxiety  She will report back during the next session re: successes and barriers  At the next session, this clinician will use supportive psychotherapy, client-centered therapy, mindfulness-based strategies, DBT-informed skills, Motivational Interviewing and solution-focused therapy to address her mood regulation and relationship concerns, in an effort to assist Leandro Miles with meeting treatment goals  HPI     Past Medical History:   Diagnosis Date    Asthma      No past surgical history on file  Current Outpatient Medications   Medication Sig Dispense Refill    beclomethasone (QVAR REDIHALER) 40 MCG/ACT inhaler Inhale 2 puffs 2 (two) times a day      fluticasone (FLONASE) 50 mcg/act nasal spray 2 sprays into each nostril      loratadine (CLARITIN) 10 mg tablet Take 10 mg by mouth      montelukast (SINGULAIR) 10 mg tablet Take 10 mg by mouth       No current facility-administered medications for this visit  Allergies   Allergen Reactions    Aspirin Shortness Of Breath    Aspirin Buf(Gsuic-Snibc-Vyxnr) Chest Pain, Cough and Shortness Of Breath    Naproxen Shortness Of Breath    Penicillins Hives, Itching, Shortness Of Breath and Wheezing    Latex Itching    Hydrocodone-Acetaminophen Rash     Review of Systems    Video Exam    There were no vitals filed for this visit  Physical Exam     I spent 45 minutes directly with the patient during this visit     Session start time: 11:06am  Session end time: 11:51am      VIRTUAL VISIT DISCLAIMER    Luis Miller verbally agrees to participate in [employer]   Pt is aware that [employer] could be limited without vital signs or the ability to perform a full hands-on physical Laverta Shank understands she or the provider may request at any time to terminate the video visit and request the patient to seek care or treatment in person

## 2022-08-26 ENCOUNTER — TELEMEDICINE (OUTPATIENT)
Dept: BEHAVIORAL/MENTAL HEALTH CLINIC | Facility: CLINIC | Age: 37
End: 2022-08-26
Payer: COMMERCIAL

## 2022-08-26 DIAGNOSIS — F41.1 GENERALIZED ANXIETY DISORDER: Primary | ICD-10-CM

## 2022-08-26 DIAGNOSIS — F43.10 POST TRAUMATIC STRESS DISORDER (PTSD): ICD-10-CM

## 2022-08-26 PROCEDURE — 90834 PSYTX W PT 45 MINUTES: CPT | Performed by: SOCIAL WORKER
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
## 2022-08-26 NOTE — PSYCH
Virtual Regular Visit    Verification of patient location:    Patient is located in the following state in which I hold an active license [place]    Assessment/Plan:    Problem List Items Addressed This Visit        Other    Generalized anxiety disorder - Primary    Post traumatic stress disorder (PTSD)        Goals addressed in session: Goal 1      Reason for visit is No chief complaint on file  Encounter provider [facility] D[facility]ins    Provider located at [address] [place] [id number] [phone]    Recent Visits  No visits were found meeting these conditions  Showing recent visits within past 7 days and meeting all other requirements  Future Appointments  No visits were found meeting these conditions  Showing future appointments within next 150 days and meeting all other requirements     The patient was identified by name and date of birth  Rosa Boothe was informed that this is a telemedicine visit and that the visit is being conducted throughEpic Embedded and patient was informed this is a secure, HIPAA-complaint platform  She agrees to proceed     My office door was closed  The patient was notified the following individuals were present in the room: This session was attended by Arsen Reynoso, New employee orientation  Leonela Nava provided verbal consent for the new employee to sit in on this and future sessions  She acknowledges understanding that she can opt out of student observations/participation at any time     She acknowledged consent and understanding of privacy and security of the video platform  The patient has agreed to participate and understands they can discontinue the visit at any time  Patient is aware this is a billable service  Subjective  Rosa Boothe is a 30 y o  female  DATA: Met with Leonela Nava for scheduled individual session   Topics of discussion included relationship with significant other, family stressors, relationships with family, trauma history, physical health concerns and mood regulation and symptoms  "I need to come in, and I need to do trauma therapy " We changed her next appointment to an in-person session  Diana Juarez states that her SO has recently gotten out of [facility]  She I discussed her readjustment to his return home  Diana Juarez was in the car during her session, and her SO was present  She spoke very openly about the limits/boundaries that she is setting with him  She states that his recent incarceration gave her the confidence that she can manage her household without him being present  She states that she has given him very specific limits regarding what she is willing to accept and what the "dealbreaker" behaviors are  She states that she plans to engage in couples therapy with him, to work on their relationship  We spent some time working on updating Monse's treatment plan  She states that she wants to address her trauma, so she can move forward with her life in a healthier way  Client shows evidence of utilizing emotion regulation skills, effective communication skills and distress tolerance skills skills to manage mental health symptoms  During this session, this clinician used the following therapeutic modalities: supportive psychotherapy, client-centered therapy, mindfulness-based strategies, DBT-informed skills, Motivational Interviewing and solution-focused therapy  ASSESSMENT: Diana Juarez presents with a normal mood  Her affect is normal range and intensity, appropriate  Diana Juarez exhibits good therapeutic rapport with this clinician  Diana Juarez continues to exhibit willingness to work on treatment goals and objectives  Diana Juarez presents with a minimal risk of suicide, minimal risk of self-harm, and minimal risk of harm to others  PLAN: Diana Juarez will return in approximately three weeks for the next scheduled session   Between sessions, Diana Juarez will continue to set/maintain limits with her SO and will report back during the next session re: successes and barriers  At the next session, this clinician will use supportive psychotherapy, client-centered therapy, mindfulness-based strategies, DBT-informed skills, Motivational Interviewing and solution-focused therapy to address her relationship issues and mood regulation, in an effort to assist James Markham with meeting treatment goals  HPI     Past Medical History:   Diagnosis Date    Asthma        No past surgical history on file  Current Outpatient Medications   Medication Sig Dispense Refill    beclomethasone (QVAR REDIHALER) 40 MCG/ACT inhaler Inhale 2 puffs 2 (two) times a day      fluticasone (FLONASE) 50 mcg/act nasal spray 2 sprays into each nostril      loratadine (CLARITIN) 10 mg tablet Take 10 mg by mouth      montelukast (SINGULAIR) 10 mg tablet Take 10 mg by mouth       No current facility-administered medications for this visit  Allergies   Allergen Reactions    Aspirin Shortness Of Breath    Aspirin Buf(Hjmca-Ysqto-Ibxmx) Chest Pain, Cough and Shortness Of Breath    Naproxen Shortness Of Breath    Penicillins Hives, Itching, Shortness Of Breath and Wheezing    Latex Itching    Hydrocodone-Acetaminophen Rash       Review of Systems    Video Exam    There were no vitals filed for this visit      Physical Exam     I spent 50 minutes directly with the patient during this visit    Session start time: 11:03am  Session end time: 11:53am

## 2022-09-19 ENCOUNTER — SOCIAL WORK (OUTPATIENT)
Dept: BEHAVIORAL/MENTAL HEALTH CLINIC | Facility: CLINIC | Age: 37
End: 2022-09-19
Payer: COMMERCIAL

## 2022-09-19 DIAGNOSIS — F43.10 POST TRAUMATIC STRESS DISORDER (PTSD): ICD-10-CM

## 2022-09-19 DIAGNOSIS — F41.1 GENERALIZED ANXIETY DISORDER: Primary | ICD-10-CM

## 2022-09-19 PROCEDURE — 90834 PSYTX W PT 45 MINUTES: CPT | Performed by: SOCIAL WORKER
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
## 2022-10-10 ENCOUNTER — SOCIAL WORK (OUTPATIENT)
Dept: BEHAVIORAL/MENTAL HEALTH CLINIC | Facility: CLINIC | Age: 37
End: 2022-10-10
Payer: COMMERCIAL

## 2022-10-10 DIAGNOSIS — F41.1 GENERALIZED ANXIETY DISORDER: Primary | ICD-10-CM

## 2022-10-10 DIAGNOSIS — F43.10 POST TRAUMATIC STRESS DISORDER (PTSD): ICD-10-CM

## 2022-10-10 PROCEDURE — 90834 PSYTX W PT 45 MINUTES: CPT | Performed by: SOCIAL WORKER

## 2022-10-10 NOTE — PSYCH
Psychotherapy Provided: Individual Psychotherapy     Follow up in 2 weeks    Encounter Diagnosis     ICD-10-CM    1  Generalized anxiety disorder  F41 1    2  Post traumatic stress disorder (PTSD)  F43 10        Goals addressed in session: Goal 1     Current suicide risk : Low     DATA: Met with Nisha Nath for scheduled individual session  Topics of discussion included relationship with significant other, relationships with family and mood regulation and symptoms  "I'm tired " Nisha Nath discussed her children being off from school today, due to the holiday  We spent some time discussing her intimate partner relationship  Nisha Nath states that she and Eloise Sharma have attended two family therapy sessions  We discussed her ambivalence about the relationship, her emotions, her thoughts, and her expectations related to this relationship  She states that her SO wants marriage, despite his continued behaviors of messaging other women  We discussed the reasons that she loves him and wants the relationship-- "He is there for me  He makes me happy " She states that she feels that he is completely present for her and the children when he is at home  We discussed her limits/boundaries relating to his behaviors  She states that she does not want to have a marriage to him until he has "proven" that he can be faithful  She states that she is not yet ready to make the commitment to this relationship  We spent some time discussing what trust means to Nisha Nath and how important trust is to her in her relationship  She states, "I don't even know what trust is  I don't know what trust looks life or feels like " She states that the only person who she has trusted is her mother  Nisha Nath states that she wants to continue to explore these issues, so she can move forward in her life and decide how to manage her current relationship  Client shows evidence of utilizing weighing pros and cons and emotion regulation skills skills to manage mental health symptoms  During this session, this clinician used the following therapeutic modalities: supportive psychotherapy, client-centered therapy, mindfulness-based strategies, DBT-informed skills, Motivational Interviewing and solution-focused therapy  ASSESSMENT: Tanvi Shafer presents with a somewhat anxious mood  Her affect is normal range and intensity, appropriate  Tanvi Shafer exhibits good therapeutic rapport with this clinician  Tanvi Shafer continues to exhibit willingness to work on treatment goals and objectives  Tanvi Shafer presents with a minimal risk of suicide, minimal risk of self-harm, and minimal risk of harm to others  PLAN: Tanvi Shafer will return in approximately two weeks for the next scheduled session  Between sessions, Tanvi Shafer will continue to set/maintain limits with her SO  She will explore her thoughts/feelings (through journaling) regarding her options within this relationship  Tanvi Shafer will report back during the next session re: successes and barriers  At the next session, this clinician will use supportive psychotherapy, client-centered therapy, mindfulness-based strategies, DBT-informed skills, Motivational Interviewing and solution-focused therapy to address her mood regulation and relationship concerns, in an effort to assist Tanvi Shafer with meeting treatment goals  Behavioral Health Treatment Plan [facility]: Diagnosis and Treatment Plan explained to Debbi Winkler relates understanding diagnosis and is agreeable to Treatment Plan   Yes     Visit Time    Visit Start Time: 2:03pm  Visit Stop Time: 2:53pm  Total Visit Duration: 50 minutes

## 2022-10-28 ENCOUNTER — TELEMEDICINE (OUTPATIENT)
Dept: BEHAVIORAL/MENTAL HEALTH CLINIC | Facility: CLINIC | Age: 37
End: 2022-10-28

## 2022-10-28 DIAGNOSIS — F41.1 GENERALIZED ANXIETY DISORDER: Primary | ICD-10-CM

## 2022-10-28 DIAGNOSIS — F43.10 POST TRAUMATIC STRESS DISORDER (PTSD): ICD-10-CM

## 2022-10-28 NOTE — PSYCH
Virtual Regular Visit    Verification of patient location:    Patient is located in the following state in which I hold an active license [place]    Assessment/Plan:    Problem List Items Addressed This Visit        Other    Generalized anxiety disorder - Primary    Post traumatic stress disorder (PTSD)        Goals addressed in session: Goal 1      Reason for visit is No chief complaint on file  Encounter provider [facility]    Provider located at [address] [place] [id number] [phone]    Recent Visits  No visits were found meeting these conditions  Showing recent visits within past 7 days and meeting all other requirements  Future Appointments  No visits were found meeting these conditions  Showing future appointments within next 150 days and meeting all other requirements     The patient was identified by name and date of birth  Leydi Santana was informed that this is a telemedicine visit and that the visit is being conducted throughthe [employer]  She agrees to proceed     My office door was closed  No one else was in the room  She acknowledged consent and understanding of privacy and security of the video platform  The patient has agreed to participate and understands they can discontinue the visit at any time  Patient is aware this is a billable service  Subjective  Leydi Santana is a 30 y o  female  DATA: Met with Nick Wagner for scheduled individual session  Topics of discussion included relationship with significant other, relationships with family, parenting concerns and mood regulation and symptoms  Nick Wagner states that she switched her visit to virtual, due to not having   Nick Wagner states that she and her SO are getting along fairly well at this point  They have started their couple's therapy   Nick Wagner states that her SO had a revelation about the reasons that he reaches out for emotional connection from others  They have been talking about his feelings and his insecurities  She will continue to work on these issues in their family sessions  Shelby Doyle states that she has also been able to talk with him about her feelings related to their daughter and her medical problems  Overall, Shelby Doyle is focusing on setting limits/boundaries with her SO and sticking to them  We will continue to move forward with trauma processing when she is able to return to the office for her sessions  Client shows evidence of utilizing Mindfulness-based strategies and emotion regulation skills skills to manage mental health symptoms  During this session, this clinician used the following therapeutic modalities: supportive psychotherapy, client-centered therapy, mindfulness-based strategies, DBT-informed skills, Motivational Interviewing and solution-focused therapy  ASSESSMENT: Shelby Doyle presents with a normal mood  Her affect is normal range and intensity, appropriate  Shelby Doyle exhibits good therapeutic rapport with this clinician  Shelby Doyle continues to exhibit willingness to work on treatment goals and objectives  Shelby Doyle presents with a minimal risk of suicide, minimal risk of self-harm, and minimal risk of harm to others  PLAN: Shelby Doyle will return in approximately 2-3 weeks for the next scheduled session  Between sessions, Shelby Doyle will continue to set/maintain limits with her SO and will report back during the next session re: successes and barriers  At the next session, this clinician will use supportive psychotherapy, client-centered therapy, mindfulness-based strategies, DBT-informed skills, Motivational Interviewing and solution-focused therapy to address her mood regulation and relationship concerns, in an effort to assist Shelby Doyle with meeting treatment goals  HPI     Past Medical History:   Diagnosis Date   • Asthma        No past surgical history on file      Current Outpatient Medications   Medication Sig Dispense Refill   • beclomethasone (QVAR REDIHALER) 40 MCG/ACT inhaler Inhale 2 puffs 2 (two) times a day     • fluticasone (FLONASE) 50 mcg/act nasal spray 2 sprays into each nostril     • loratadine (CLARITIN) 10 mg tablet Take 10 mg by mouth     • montelukast (SINGULAIR) 10 mg tablet Take 10 mg by mouth       No current facility-administered medications for this visit  Allergies   Allergen Reactions   • Aspirin Shortness Of Breath   • Aspirin Buf(Cizcl-Tyheu-Fwklj) Chest Pain, Cough and Shortness Of Breath   • Naproxen Shortness Of Breath   • Penicillins Hives, Itching, Shortness Of Breath and Wheezing   • Latex Itching   • Hydrocodone-Acetaminophen Rash       Review of Systems    Video Exam    There were no vitals filed for this visit      Physical Exam     Visit Time    Visit Start Time: 11:05am  Visit Stop Time: 11:54am  Total Visit Duration: 49 minutes

## 2022-11-11 ENCOUNTER — SOCIAL WORK (OUTPATIENT)
Dept: BEHAVIORAL/MENTAL HEALTH CLINIC | Facility: CLINIC | Age: 37
End: 2022-11-11

## 2022-11-11 DIAGNOSIS — F43.10 POST TRAUMATIC STRESS DISORDER (PTSD): ICD-10-CM

## 2022-11-11 DIAGNOSIS — F41.1 GENERALIZED ANXIETY DISORDER: Primary | ICD-10-CM
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
## 2022-11-28 ENCOUNTER — SOCIAL WORK (OUTPATIENT)
Dept: BEHAVIORAL/MENTAL HEALTH CLINIC | Facility: CLINIC | Age: 37
End: 2022-11-28

## 2022-11-28 DIAGNOSIS — F41.1 GENERALIZED ANXIETY DISORDER: Primary | ICD-10-CM

## 2022-11-28 DIAGNOSIS — F43.10 POST TRAUMATIC STRESS DISORDER (PTSD): ICD-10-CM
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
## 2022-12-12 ENCOUNTER — TELEPHONE (OUTPATIENT)
Dept: PSYCHIATRY | Facility: CLINIC | Age: 37
End: 2022-12-12

## 2022-12-12 NOTE — TELEPHONE ENCOUNTER
Patient is calling regarding cancelling an appointment      Date/Time: 12/12 at 1 pm    Reason: migraine    Patient was rescheduled: YES [] NO [x]  If yes, when was Patient reschedule for: had 2 week apt already scheduled    Patient requesting call back to reschedule: YES [] NO [x]

## 2022-12-27 ENCOUNTER — SOCIAL WORK (OUTPATIENT)
Dept: BEHAVIORAL/MENTAL HEALTH CLINIC | Facility: CLINIC | Age: 37
End: 2022-12-27

## 2022-12-27 DIAGNOSIS — F41.1 GENERALIZED ANXIETY DISORDER: Primary | ICD-10-CM

## 2022-12-27 DIAGNOSIS — F43.10 POST TRAUMATIC STRESS DISORDER (PTSD): ICD-10-CM

## 2022-12-27 NOTE — PSYCH
Psychotherapy Provided: Individual psychotherapy     Follow up in three weeks    Encounter Diagnosis     ICD-10-CM    1  Generalized anxiety disorder  F41 1       2  Post traumatic stress disorder (PTSD)  F43 10           Goals addressed in session: Goal 1     Current suicide risk : Low     DATA: Met with Joelle Penn for scheduled individual session  Topics of discussion included relationship with significant other, family stressors, physical health concerns, parenting concerns and mood regulation and symptoms  Joelle Fili discussed some of her recent stressors, including family-related concerns, car maintenance problems, etc  She states that her SO will help with the car-related issues, as he knows how to do some repairs on his own  Joelle Fili discussed getting news from her mother that a family member  last night  She states that he has known him for a long time; however, she states that she is managing well at this point in time  Joelle Penn discussed her hobby crafting  She states that she was happy to get an [employer] Pen for [employer], to help her do more art creations  Joelle Penn discussed her daughter's health  She states that they recently found out that she has an egg allergy  Her NG tub has been removed; however, she is still not gaining weight at the rate that the medical team would expect  She states that they are doing a full hormone blood panel, to see if her growth hormones are at a normal level  She remains on-point developmentally  Joelle Penn discussed her relationship with her SO  She discussed a recent communication that her SO had with her ex-  Joelle Penn plans to have a discussion about her concerns with this communication  She discussed the relationship of a step-parent with the children and the lack of legal protections for step parent relationships  Joelle Penn states that she and Linden Kc continue to attend couple's sessions  She states that the therapy is working well for them   Joelle Penn discussed the custody situation with her ex-  She discussed the impact of the custody and visitation on the children (especially over the [place] holiday)  She states that he has given up his last three visits with the children and significantly shortened his visit on [place] day  She expressed her frustration with his lack of follow through with the court-ordered testing  She discussed her concerns about her ex- not having a separate bedroom for his children  She expressed her concerns about her ex's past behaviors and her concerns for the potential safety of her children, if her ex- would take her back to court for increased visitation  Sadie Sandoval discussed her health-related concerns  She states that she had to have another cortisone shot in her finger  She states that she has had to have several injections to decrease pain in her fingers  Client shows evidence of utilizing emotion regulation skills and distress tolerance skills skills to manage mental health symptoms  During this session, this clinician used the following therapeutic modalities: supportive psychotherapy, client-centered therapy, mindfulness-based strategies, DBT-informed skills, Motivational Interviewing and solution-focused therapy  ASSESSMENT: Sadie Sandoval presents with a euthymic mood  Her affect is normal range and intensity, appropriate  Sadie Sandoval exhibits good therapeutic rapport with this clinician  Sadie Sandoval continues to exhibit willingness to work on treatment goals and objectives  Sadie Sandoval presents with a minimal risk of suicide, minimal risk of self-harm, and minimal risk of harm to others  PLAN: Sadie Sandoval will return in three weeks for the next scheduled session  Between sessions, Sadie Sandoval will continue to monitor her moods  She will continue to use her existing coping skills to manage her moods, and she will report back during the next session re: successes and barriers   At the next session, this clinician will use supportive psychotherapy, client-centered therapy, mindfulness-based strategies, DBT-informed skills, Motivational Interviewing and solution-focused therapy to address her mood regulation and relationship concerns, in an effort to assist Chichi with meeting treatment goals  Behavioral Health Treatment Plan [facility]: Diagnosis and Treatment Plan explained to Felicita Cornejo relates understanding diagnosis and is agreeable to Treatment Plan   Yes     Visit start and stop times:    12/27/22  Start Time: 1406  Stop Time: 1457  Total Visit Time: 51 minutes

## 2023-01-17 ENCOUNTER — SOCIAL WORK (OUTPATIENT)
Dept: BEHAVIORAL/MENTAL HEALTH CLINIC | Facility: CLINIC | Age: 38
End: 2023-01-17

## 2023-01-17 DIAGNOSIS — F41.1 GENERALIZED ANXIETY DISORDER: Primary | ICD-10-CM

## 2023-01-17 DIAGNOSIS — F43.10 POST TRAUMATIC STRESS DISORDER (PTSD): ICD-10-CM
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
## 2023-01-30 ENCOUNTER — TELEPHONE (OUTPATIENT)
Dept: PSYCHIATRY | Facility: CLINIC | Age: 38
End: 2023-01-30

## 2023-01-30 ENCOUNTER — TELEMEDICINE (OUTPATIENT)
Dept: BEHAVIORAL/MENTAL HEALTH CLINIC | Facility: CLINIC | Age: 38
End: 2023-01-30

## 2023-01-30 DIAGNOSIS — F41.1 GENERALIZED ANXIETY DISORDER: Primary | ICD-10-CM

## 2023-01-30 DIAGNOSIS — F43.10 POST TRAUMATIC STRESS DISORDER (PTSD): ICD-10-CM
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
## 2023-01-30 NOTE — TELEPHONE ENCOUNTER
Pt called in and needed to switch her appt to virtual due to car trouble  Writer was able to switch that for her

## 2023-02-17 ENCOUNTER — TELEPHONE (OUTPATIENT)
Dept: PSYCHIATRY | Facility: CLINIC | Age: 38
End: 2023-02-17

## 2023-02-17 ENCOUNTER — TELEMEDICINE (OUTPATIENT)
Dept: BEHAVIORAL/MENTAL HEALTH CLINIC | Facility: CLINIC | Age: 38
End: 2023-02-17

## 2023-02-17 DIAGNOSIS — F41.1 GENERALIZED ANXIETY DISORDER: Primary | ICD-10-CM

## 2023-02-17 DIAGNOSIS — F43.10 POST TRAUMATIC STRESS DISORDER (PTSD): ICD-10-CM

## 2023-02-17 NOTE — PSYCH
Virtual Regular Visit    Verification of patient location:    Patient is located in the following state in which I hold an active license [place]    Assessment/Plan:    Problem List Items Addressed This Visit        Other    Generalized anxiety disorder - Primary    Post traumatic stress disorder (PTSD)     Goals addressed in session: Goal 1      Reason for visit is   Chief Complaint   Patient presents with   • Virtual Regular Visit        Encounter provider [facility]    Provider located at [address] [place], [place] [address]  [address] [address] [phone]      Recent Visits  No visits were found meeting these conditions  Showing recent visits within past 7 days and meeting all other requirements  Today's Visits  Date Type Provider Dept   02/17/23 Telephone Chelle Rodarte, Gabrielle Gee   02/17/23 [address] today's visits and meeting all other requirements  Future Appointments  No visits were found meeting these conditions  Showing future appointments within next 150 days and meeting all other requirements       The patient was identified by name and date of birth  Briana Hassan was informed that this is a telemedicine visit and [facility]  She agrees to proceed     My office door was closed  No one else was in the room  She acknowledged consent and understanding of privacy and security of the video platform  The patient has agreed to participate and understands they can discontinue the visit at any time  Patient is aware this is a billable service  Subjective  Briana Hassan is a 30 y o  female  HPI     Past Medical History:   Diagnosis Date   • Asthma        No past surgical history on file      Current Outpatient Medications   Medication Sig Dispense Refill   • beclomethasone (QVAR REDIHALER) 40 MCG/ACT inhaler Inhale 2 puffs 2 (two) times a day     • fluticasone (FLONASE) 50 mcg/act nasal spray 2 sprays into each nostril     • loratadine (CLARITIN) 10 mg tablet Take 10 mg by mouth     • montelukast (SINGULAIR) 10 mg tablet Take 10 mg by mouth       No current facility-administered medications for this visit  Allergies   Allergen Reactions   • Aspirin Shortness Of Breath   • Aspirin Buf(Ijaun-Rggqm-Scixl) Chest Pain, Cough and Shortness Of Breath   • Naproxen Shortness Of Breath   • Penicillins Hives, Itching, Shortness Of Breath and Wheezing   • Latex Itching   • Hydrocodone-Acetaminophen Rash       Review of Systems    Video Exam    There were no vitals filed for this visit  Physical Exam     Behavioral Health Psychotherapy Progress Note    Psychotherapy Provided: Individual Psychotherapy     1  Generalized anxiety disorder        2  Post traumatic stress disorder (PTSD)          Goals addressed in session: Goal 1     DATA: Chichi changed this session to a virtual session due to her daughter being ill  She discussed the stress of caring for her children when they are feeling ill  Chichi states that, at this point, she and her SO are getting along fairly well  We spent some time discussing Monse's treatment plan  We were not able to complete the entire plan today  She will either send me information regarding her treatment goals, or we will complete at the next session  She states that she has been working on practicing skills for anxiety management  She is also working on setting and maintaining limits and boundaries with her SO  We will continue to move forward with these goals/objectives in our future sessions  We will complete her treatment plan during the next session        During this session, this clinician used the following therapeutic modalities: Client-centered Therapy, Dialectical Behavior Therapy, Mindfulness-based Strategies, Motivational Interviewing, Solution-Focused Therapy and Supportive Psychotherapy    Substance Abuse was not addressed during this session  If the client is diagnosed with a co-occurring substance use disorder, please indicate any changes in the frequency or amount of use: N/A  Stage of change for addressing substance use diagnoses: No substance use/Not applicable    ASSESSMENT:  Apolonia Parnell presents with a somewhat anxious mood, which is appropriate to her current situation  her affect is Normal range and intensity, which is congruent, with her mood and the content of the session  The client has made progress on their goals  Apolonia Parnell presents with a minimal risk of suicide, minimal risk of self-harm, and minimal risk of harm to others  For any risk assessment that surpasses a "low" rating, a safety plan must be developed  A safety plan was indicated: no  If yes, describe in detail N/A    PLAN: Between sessions, Apolonia Parnell will consider the goals/objectives that she would like to address in her continued therapy sessions  She will monitor her moods and practice DBT-informed skills to manage her anxiety  At the next session, the therapist will use Client-centered Therapy, Dialectical Behavior Therapy, Mindfulness-based Strategies, Motivational Interviewing, Solution-Focused Therapy and Supportive Psychotherapy to address her mood regulation and relationship concerns  Behavioral Health Treatment Plan and Discharge Planning: Apolonia Parnell is aware of and agrees to continue to work on their treatment plan  They have identified and are working toward their discharge goals   yes    Visit start and stop times:    02/17/23  Start Time: 1008  Stop Time: 1058  Total Visit Time: 50 minutes

## 2023-03-03 ENCOUNTER — TELEMEDICINE (OUTPATIENT)
Dept: BEHAVIORAL/MENTAL HEALTH CLINIC | Facility: CLINIC | Age: 38
End: 2023-03-03

## 2023-03-03 DIAGNOSIS — F41.1 GENERALIZED ANXIETY DISORDER: Primary | ICD-10-CM

## 2023-03-03 DIAGNOSIS — F43.10 POST TRAUMATIC STRESS DISORDER (PTSD): ICD-10-CM
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
## 2023-03-03 NOTE — BH TREATMENT PLAN
Outpatient Behavioral Health Psychotherapy Treatment Plan    Tiffanie Begum  1985     Date of Initial Psychotherapy Assessment: 03/27/2019   Date of Current Treatment Plan: 03/03/23  Treatment Plan Target Date: 07/01/2023  Treatment Plan Expiration Date: 08/30/2023    Diagnosis:   1  Generalized anxiety disorder        2  Post traumatic stress disorder (PTSD)          Area(s) of Need: Mood regulation/ Relationship concerns/Hypervigilance (trauma reaction)    Long Term Goal 1 (in the client's own words): "I want to be able to understand and change the way I react to confrontation  I feel that in every situation, I feel like I'm outside of the situation and am watching the situation  I don't to feel like I'm watching and waiting for something to happen  I want to be able to enjoy my life "     Stage of Change: Action    Target Date for completion: 03/03/2024     Anticipated therapeutic modalities: Client-centered; DBT-informed; Motivational Interviewing; Solution-focused; Supportive psychotherapy; mindfulness     People identified to complete this goal: Maryana Galvan (client) and Dewayne Llamas (clinician)      Objective 1: (identify the means of measuring success in meeting the objective): Maryana Galvan will maintain her psychiatric medications (which she gets from her doctor at [facility]) She will maintain a minimum of 90% medication adherence  Maryana Galvan states that she takes her medications as prescribed  She notes no current adverse effects from her medications  Objective 2: (identify the means of measuring success in meeting the objective): Maryana Galvan will learn and exhibit understanding of the DBT-informed effective communication skills  She will learn and practice the DIPAK/GIVE/FAST skills  She states, "I want to feel like I can communicate and not feel attacked while I'm doing it " Maryana Galvan will cite examples of practicing these skills-- and will discuss successes and barriers to effective use of these skills          Objective 3: (identify the means of measuring success in meeting the objective): Paola Jacobson will continue to identify (and exhibit understanding of) DBT-informed skills  She will practice a minimum of three distress tolerance skills and will cite examples of use in our sessions  She will focus on successes and barriers to the use of these skills  Paola Jacobson states that she continues to work on using exercise as a primary coping skill  Objective 4: (identify the means of measuring success in meeting the objective): Paola Jacobson will identify and subjectively rate her symptoms of anxiety/distress  She will continue to work on decreasing her sensitivity to stressful situations by using prolonged exposure  She has identified some instances of using this skill to decrease her anxiety  She is able to identify some minimal changes in her level of anxiety  Paola Jacobson will continue to identify issues and events where prolonged exposure might be helpful to her  I am currently under the care of a [facility] psychiatric provider: no    My [facility] psychiatric provider is: N/A  I am currently taking psychiatric medications: Yes, as prescribed  Paola Jacobson receives her psychiatric medications through her primary care providers at [facility]  She states that she occasionally forgets to take her medications (especially when she is feeling physically unwell)  She states that typically she is very good at taking her medications regularly  I feel that I will be ready for discharge from mental health care when I reach the following (measurable goal/objective): "I don't think I'll ever be ready for discharge  I think there will come a time when I need a different type of therapy  I think that we will be able to change the topic or the frequency of therapy "     For children and adults who have a legal guardian:   Has there been any change to custody orders and/or guardianship status? NA  If yes, attach updated documentation      I have not created my Crisis Plan during this session  Behavioral Health Treatment Plan [facility]: Diagnosis and Treatment Plan explained to Kalpana Owens acknowledges an understanding of their diagnosis  Laura Sibley agrees to this treatment plan  I have been offered a copy of this Treatment Plan  yes    Laura Sibley, 1985, actively participated in the review and update of this treatment plan during a virtual session, using the [employer]  Laura Sibley  provided verbal consent on 3/3/2023 at 3:26 PM  The treatment plan was transcribed into the [employer] Record at a later time  The treatment plan will be sent via [employer] for client signature and consent

## 2023-03-09 ENCOUNTER — TELEPHONE (OUTPATIENT)
Dept: BEHAVIORAL/MENTAL HEALTH CLINIC | Facility: CLINIC | Age: 38
End: 2023-03-09

## 2023-03-09 NOTE — TELEPHONE ENCOUNTER
Left message for Nicol Clark  and/or Parent/Guardian to call office back at [phone] to schedule appointment with SASHA Melchor     Reason:   Baljinder   Changed need to move her to March 14th at 2 pm just need her to confirm     Last completed appointment with provider:

## 2023-03-14 ENCOUNTER — TELEMEDICINE (OUTPATIENT)
Dept: BEHAVIORAL/MENTAL HEALTH CLINIC | Facility: CLINIC | Age: 38
End: 2023-03-14

## 2023-03-14 DIAGNOSIS — F43.10 POST TRAUMATIC STRESS DISORDER (PTSD): ICD-10-CM

## 2023-03-14 DIAGNOSIS — F41.1 GENERALIZED ANXIETY DISORDER: Primary | ICD-10-CM

## 2023-03-16 ENCOUNTER — TELEPHONE (OUTPATIENT)
Dept: PSYCHIATRY | Facility: CLINIC | Age: 38
End: 2023-03-16

## 2023-03-16 NOTE — TELEPHONE ENCOUNTER
Patient called and said she sat with you and filled out a release of information form with you last year to get her records for SSI  She said she needs these records before 4/11/2023 but we do not have a records request on file for her  I did tell her to come in to the  to fill it out and medical records takes about 14 days to receive, but she's adamant that she did it with you and asked me to reach out to you to see if you can give her a call in regards to this matter   Thank You
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
## 2023-07-06 ENCOUNTER — DOCUMENTATION (OUTPATIENT)
Dept: BEHAVIORAL/MENTAL HEALTH CLINIC | Facility: CLINIC | Age: 38
End: 2023-07-06

## 2023-07-06 DIAGNOSIS — F43.10 POST TRAUMATIC STRESS DISORDER (PTSD): ICD-10-CM

## 2023-07-06 DIAGNOSIS — F41.1 GENERALIZED ANXIETY DISORDER: Primary | ICD-10-CM
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
## 2024-01-15 ENCOUNTER — TELEPHONE (OUTPATIENT)
Dept: PSYCHIATRY | Facility: CLINIC | Age: 39
End: 2024-01-15

## 2024-01-15 NOTE — TELEPHONE ENCOUNTER
Patient has been added to the Medication Management wait list without a referral.    Insurance: [employer]  Insurance Type:    Commercial []   Medicaid [x]   [facility] (if applicable)   Medicare []  Location Preference: any  Provider Preference: any  Virtual: Yes [] No [x]  Were outside resources sent: Yes [x] No []

## 2024-09-26 ENCOUNTER — TELEPHONE (OUTPATIENT)
Age: 39
End: 2024-09-26